# Patient Record
Sex: FEMALE | Race: WHITE | NOT HISPANIC OR LATINO | Employment: FULL TIME | URBAN - METROPOLITAN AREA
[De-identification: names, ages, dates, MRNs, and addresses within clinical notes are randomized per-mention and may not be internally consistent; named-entity substitution may affect disease eponyms.]

---

## 2017-01-31 ENCOUNTER — HISTORICAL (OUTPATIENT)
Dept: ADMINISTRATIVE | Facility: HOSPITAL | Age: 47
End: 2017-01-31

## 2017-02-07 ENCOUNTER — HISTORICAL (OUTPATIENT)
Dept: OTOLARYNGOLOGY | Facility: CLINIC | Age: 47
End: 2017-02-07

## 2017-03-22 ENCOUNTER — HISTORICAL (OUTPATIENT)
Dept: OTOLARYNGOLOGY | Facility: CLINIC | Age: 47
End: 2017-03-22

## 2017-04-21 ENCOUNTER — HISTORICAL (OUTPATIENT)
Dept: ADMINISTRATIVE | Facility: HOSPITAL | Age: 47
End: 2017-04-21

## 2017-04-21 LAB
ABS NEUT (OLG): 6.7 X10(3)/MCL (ref 2.1–9.2)
BASOPHILS # BLD AUTO: 0.03 X10(3)/MCL
BASOPHILS NFR BLD AUTO: 0 % (ref 0–1)
BUN SERPL-MCNC: 12 MG/DL (ref 7–25)
CALCIUM SERPL-MCNC: 9.4 MG/DL (ref 8.4–10.3)
CHLORIDE SERPL-SCNC: 101 MMOL/L (ref 96–110)
CO2 SERPL-SCNC: 28 MMOL/L (ref 24–32)
CREAT SERPL-MCNC: 0.51 MG/DL (ref 0.7–1.1)
EOSINOPHIL # BLD AUTO: 0.11 X10(3)/MCL
EOSINOPHIL NFR BLD AUTO: 1 % (ref 0–5)
ERYTHROCYTE [DISTWIDTH] IN BLOOD BY AUTOMATED COUNT: 14.4 % (ref 11.5–14.5)
GLUCOSE SERPL-MCNC: 120 MG/DL (ref 65–99)
HCT VFR BLD AUTO: 42.6 % (ref 35–46)
HGB BLD-MCNC: 13.8 GM/DL (ref 12–16)
IMM GRANULOCYTES # BLD AUTO: 0.04 10*3/UL
IMM GRANULOCYTES NFR BLD AUTO: 0 %
LYMPHOCYTES # BLD AUTO: 2.24 X10(3)/MCL
LYMPHOCYTES NFR BLD AUTO: 23 % (ref 15–40)
MCH RBC QN AUTO: 28.2 PG (ref 26–34)
MCHC RBC AUTO-ENTMCNC: 32.4 GM/DL (ref 31–37)
MCV RBC AUTO: 86.9 FL (ref 80–100)
MONOCYTES # BLD AUTO: 0.67 X10(3)/MCL
MONOCYTES NFR BLD AUTO: 7 % (ref 4–12)
NEUTROPHILS # BLD AUTO: 6.7 X10(3)/MCL
NEUTROPHILS NFR BLD AUTO: 68 X10(3)/MCL
PLATELET # BLD AUTO: 329 X10(3)/MCL (ref 130–400)
PMV BLD AUTO: 9.3 FL (ref 7.4–10.4)
POTASSIUM SERPL-SCNC: 4.2 MMOL/L (ref 3.6–5.2)
RBC # BLD AUTO: 4.9 X10(6)/MCL (ref 4–5.2)
SODIUM SERPL-SCNC: 137 MMOL/L (ref 135–146)
WBC # SPEC AUTO: 9.8 X10(3)/MCL (ref 4.5–11)

## 2017-04-24 ENCOUNTER — HISTORICAL (OUTPATIENT)
Dept: SURGERY | Facility: HOSPITAL | Age: 47
End: 2017-04-24

## 2017-05-02 ENCOUNTER — HISTORICAL (OUTPATIENT)
Dept: INTERNAL MEDICINE | Facility: CLINIC | Age: 47
End: 2017-05-02

## 2017-05-02 LAB
ABS NEUT (OLG): 7.03 X10(3)/MCL (ref 2.1–9.2)
ALBUMIN SERPL-MCNC: 3.5 GM/DL (ref 3.4–5)
ALBUMIN/GLOB SERPL: 1 RATIO (ref 1–2)
ALP SERPL-CCNC: 54 UNIT/L (ref 20–120)
ALT SERPL-CCNC: 23 UNIT/L
APPEARANCE, UA: CLEAR
AST SERPL-CCNC: 15 UNIT/L
BACTERIA #/AREA URNS AUTO: ABNORMAL /[HPF]
BASOPHILS # BLD AUTO: 0.03 X10(3)/MCL
BASOPHILS NFR BLD AUTO: 0 % (ref 0–1)
BILIRUB SERPL-MCNC: 0.8 MG/DL
BILIRUB UR QL STRIP: NEGATIVE
BILIRUBIN DIRECT+TOT PNL SERPL-MCNC: 0.1 MG/DL
BILIRUBIN DIRECT+TOT PNL SERPL-MCNC: 0.7 MG/DL
BUN SERPL-MCNC: 12 MG/DL (ref 7–25)
CALCIUM SERPL-MCNC: 9 MG/DL (ref 8.4–10.3)
CHLORIDE SERPL-SCNC: 104 MMOL/L (ref 96–110)
CHOLEST SERPL-MCNC: 173 MG/DL
CHOLEST/HDLC SERPL: 3.5 {RATIO} (ref 0–4.4)
CO2 SERPL-SCNC: 25 MMOL/L (ref 24–32)
COLOR UR: YELLOW
CREAT SERPL-MCNC: 0.55 MG/DL (ref 0.7–1.1)
DEPRECATED CALCIDIOL+CALCIFEROL SERPL-MC: 11.46 NG/ML (ref 30–80)
EOSINOPHIL # BLD AUTO: 0.11 X10(3)/MCL
EOSINOPHIL NFR BLD AUTO: 1 % (ref 0–5)
ERYTHROCYTE [DISTWIDTH] IN BLOOD BY AUTOMATED COUNT: 14 % (ref 11.5–14.5)
ERYTHROCYTE [SEDIMENTATION RATE] IN BLOOD: 27 MM/HR (ref 0–20)
GLOBULIN SER-MCNC: 4 GM/ML (ref 2.3–3.5)
GLUCOSE (UA): NORMAL
GLUCOSE SERPL-MCNC: 112 MG/DL (ref 65–99)
HAV IGM SERPL QL IA: NONREACTIVE
HBV CORE IGM SERPL QL IA: NONREACTIVE
HBV SURFACE AG SERPL QL IA: NEGATIVE
HCT VFR BLD AUTO: 41.4 % (ref 35–46)
HCV AB SERPL QL IA: NONREACTIVE
HDLC SERPL-MCNC: 50 MG/DL
HGB BLD-MCNC: 13.4 GM/DL (ref 12–16)
HGB UR QL STRIP: NEGATIVE
HIV 1+2 AB+HIV1 P24 AG SERPL QL IA: NONREACTIVE
HYALINE CASTS #/AREA URNS LPF: ABNORMAL /[LPF]
IMM GRANULOCYTES # BLD AUTO: 0.05 10*3/UL
IMM GRANULOCYTES NFR BLD AUTO: 0 %
KETONES UR QL STRIP: NEGATIVE
LDLC SERPL CALC-MCNC: 99 MG/DL (ref 0–130)
LEUKOCYTE ESTERASE UR QL STRIP: NEGATIVE
LYMPHOCYTES # BLD AUTO: 2.29 X10(3)/MCL
LYMPHOCYTES NFR BLD AUTO: 23 % (ref 15–40)
MCH RBC QN AUTO: 28.3 PG (ref 26–34)
MCHC RBC AUTO-ENTMCNC: 32.4 GM/DL (ref 31–37)
MCV RBC AUTO: 87.3 FL (ref 80–100)
MONOCYTES # BLD AUTO: 0.61 X10(3)/MCL
MONOCYTES NFR BLD AUTO: 6 % (ref 4–12)
NEUTROPHILS # BLD AUTO: 7.03 X10(3)/MCL
NEUTROPHILS NFR BLD AUTO: 70 X10(3)/MCL
NITRITE UR QL STRIP: NEGATIVE
PH UR STRIP: 6 [PH] (ref 4.5–8)
PLATELET # BLD AUTO: 332 X10(3)/MCL (ref 130–400)
PMV BLD AUTO: 9.2 FL (ref 7.4–10.4)
POTASSIUM SERPL-SCNC: 4.2 MMOL/L (ref 3.6–5.2)
PROT SERPL-MCNC: 7.5 GM/DL (ref 6–8)
PROT UR QL STRIP: 10 MG/DL
RBC # BLD AUTO: 4.74 X10(6)/MCL (ref 4–5.2)
RBC #/AREA URNS AUTO: ABNORMAL /[HPF]
RPR SER QL: NORMAL
SODIUM SERPL-SCNC: 139 MMOL/L (ref 135–146)
SP GR UR STRIP: 1.02 (ref 1–1.03)
SQUAMOUS #/AREA URNS LPF: ABNORMAL /[LPF]
T4 SERPL-MCNC: 11.33 MCG/DL (ref 6.09–12.23)
TRIGL SERPL-MCNC: 122 MG/DL
TSH SERPL-ACNC: 0.62 MIU/L (ref 0.5–5)
UROBILINOGEN UR STRIP-ACNC: NORMAL
VIT B12 SERPL-MCNC: 373 PG/ML (ref 180–914)
VLDLC SERPL CALC-MCNC: 24 MG/DL
WBC # SPEC AUTO: 10.1 X10(3)/MCL (ref 4.5–11)
WBC #/AREA URNS AUTO: ABNORMAL /HPF

## 2017-05-04 ENCOUNTER — HISTORICAL (OUTPATIENT)
Dept: RADIOLOGY | Facility: HOSPITAL | Age: 47
End: 2017-05-04

## 2017-05-08 ENCOUNTER — HISTORICAL (OUTPATIENT)
Dept: RADIOLOGY | Facility: HOSPITAL | Age: 47
End: 2017-05-08

## 2017-05-08 LAB
EST. AVERAGE GLUCOSE BLD GHB EST-MCNC: 114 MG/DL
HBA1C MFR BLD: 5.6 % (ref 4.7–5.6)

## 2017-06-05 ENCOUNTER — HISTORICAL (OUTPATIENT)
Dept: INTERNAL MEDICINE | Facility: CLINIC | Age: 47
End: 2017-06-05

## 2017-06-13 LAB — POC BETA-HCG (QUAL): NEGATIVE

## 2017-07-19 ENCOUNTER — HISTORICAL (OUTPATIENT)
Dept: ADMINISTRATIVE | Facility: HOSPITAL | Age: 47
End: 2017-07-19

## 2018-01-15 ENCOUNTER — HISTORICAL (OUTPATIENT)
Dept: INTERNAL MEDICINE | Facility: CLINIC | Age: 48
End: 2018-01-15

## 2018-01-15 LAB
APPEARANCE, UA: ABNORMAL
BACTERIA #/AREA URNS AUTO: ABNORMAL /[HPF]
BILIRUB UR QL STRIP: NEGATIVE
COLOR UR: YELLOW
GLUCOSE (UA): 300 MG/DL
HGB UR QL STRIP: NEGATIVE
HYALINE CASTS #/AREA URNS LPF: ABNORMAL /[LPF]
KETONES UR QL STRIP: ABNORMAL
LEUKOCYTE ESTERASE UR QL STRIP: NEGATIVE
NITRITE UR QL STRIP: ABNORMAL
PH UR STRIP: 6 [PH] (ref 4.5–8)
PROT UR QL STRIP: 20 MG/DL
RBC #/AREA URNS AUTO: ABNORMAL /[HPF]
SP GR UR STRIP: 1.03 (ref 1–1.03)
SQUAMOUS #/AREA URNS LPF: ABNORMAL /[LPF]
UROBILINOGEN UR STRIP-ACNC: NORMAL
WBC #/AREA URNS AUTO: ABNORMAL /HPF

## 2018-05-09 ENCOUNTER — HISTORICAL (OUTPATIENT)
Dept: ADMINISTRATIVE | Facility: HOSPITAL | Age: 48
End: 2018-05-09

## 2018-05-09 LAB
ABS NEUT (OLG): 6.11 X10(3)/MCL (ref 2.1–9.2)
ALBUMIN SERPL-MCNC: 3.6 GM/DL (ref 3.4–5)
ALBUMIN/GLOB SERPL: 1 RATIO (ref 1–2)
ALP SERPL-CCNC: 70 UNIT/L (ref 45–117)
ALT SERPL-CCNC: 34 UNIT/L (ref 12–78)
APPEARANCE, UA: CLEAR
AST SERPL-CCNC: 18 UNIT/L (ref 15–37)
BACTERIA #/AREA URNS AUTO: ABNORMAL /[HPF]
BASOPHILS # BLD AUTO: 0.04 X10(3)/MCL
BASOPHILS NFR BLD AUTO: 0 %
BILIRUB SERPL-MCNC: 0.6 MG/DL (ref 0.2–1)
BILIRUB UR QL STRIP: NEGATIVE
BILIRUBIN DIRECT+TOT PNL SERPL-MCNC: 0.2 MG/DL
BILIRUBIN DIRECT+TOT PNL SERPL-MCNC: 0.4 MG/DL
BUN SERPL-MCNC: 11 MG/DL (ref 7–18)
CALCIUM SERPL-MCNC: 9 MG/DL (ref 8.5–10.1)
CHLORIDE SERPL-SCNC: 106 MMOL/L (ref 98–107)
CHOLEST SERPL-MCNC: 172 MG/DL
CHOLEST/HDLC SERPL: 3.9 {RATIO} (ref 0–4.4)
CO2 SERPL-SCNC: 26 MMOL/L (ref 21–32)
COLOR UR: YELLOW
CREAT SERPL-MCNC: 0.6 MG/DL (ref 0.6–1.3)
CREAT UR-MCNC: 213 MG/DL
EOSINOPHIL # BLD AUTO: 0.11 10*3/UL
EOSINOPHIL NFR BLD AUTO: 1 %
ERYTHROCYTE [DISTWIDTH] IN BLOOD BY AUTOMATED COUNT: 13.7 % (ref 11.5–14.5)
EST. AVERAGE GLUCOSE BLD GHB EST-MCNC: 120 MG/DL
GLOBULIN SER-MCNC: 4.5 GM/ML (ref 2.3–3.5)
GLUCOSE (UA): NORMAL
GLUCOSE SERPL-MCNC: 104 MG/DL (ref 74–106)
HBA1C MFR BLD: 5.8 % (ref 4.2–6.3)
HCT VFR BLD AUTO: 42.1 % (ref 35–46)
HDLC SERPL-MCNC: 44 MG/DL
HGB BLD-MCNC: 13.8 GM/DL (ref 12–16)
HGB UR QL STRIP: NEGATIVE
HYALINE CASTS #/AREA URNS LPF: ABNORMAL /[LPF]
IMM GRANULOCYTES # BLD AUTO: 0.04 10*3/UL
IMM GRANULOCYTES NFR BLD AUTO: 0 %
KETONES UR QL STRIP: NEGATIVE
LDLC SERPL CALC-MCNC: 108 MG/DL (ref 0–130)
LEUKOCYTE ESTERASE UR QL STRIP: NEGATIVE
LYMPHOCYTES # BLD AUTO: 2.82 X10(3)/MCL
LYMPHOCYTES NFR BLD AUTO: 29 % (ref 13–40)
MCH RBC QN AUTO: 29.2 PG (ref 26–34)
MCHC RBC AUTO-ENTMCNC: 32.8 GM/DL (ref 31–37)
MCV RBC AUTO: 89 FL (ref 80–100)
MICROALBUMIN UR-MCNC: 14 MG/L (ref 0–19)
MICROALBUMIN/CREAT RATIO PNL UR: 6.6 MCG/MG CR (ref 0–29)
MONOCYTES # BLD AUTO: 0.68 X10(3)/MCL
MONOCYTES NFR BLD AUTO: 7 % (ref 4–12)
NEUTROPHILS # BLD AUTO: 6.11 X10(3)/MCL
NEUTROPHILS NFR BLD AUTO: 62 X10(3)/MCL
NITRITE UR QL STRIP: NEGATIVE
PH UR STRIP: 6 [PH] (ref 4.5–8)
PLATELET # BLD AUTO: 354 X10(3)/MCL (ref 130–400)
PMV BLD AUTO: 9.8 FL (ref 7.4–10.4)
POTASSIUM SERPL-SCNC: 4.3 MMOL/L (ref 3.5–5.1)
PROT SERPL-MCNC: 8.1 GM/DL (ref 6.4–8.2)
PROT UR QL STRIP: 20 MG/DL
RBC # BLD AUTO: 4.73 X10(6)/MCL (ref 4–5.2)
RBC #/AREA URNS AUTO: ABNORMAL /[HPF]
SODIUM SERPL-SCNC: 137 MMOL/L (ref 136–145)
SP GR UR STRIP: 1.03 (ref 1–1.03)
SQUAMOUS #/AREA URNS LPF: ABNORMAL /[LPF]
TRIGL SERPL-MCNC: 98 MG/DL
TSH SERPL-ACNC: 1.29 MIU/L (ref 0.36–3.74)
UROBILINOGEN UR STRIP-ACNC: NORMAL
VLDLC SERPL CALC-MCNC: 20 MG/DL
WBC # SPEC AUTO: 9.8 X10(3)/MCL (ref 4.5–11)
WBC #/AREA URNS AUTO: ABNORMAL /HPF

## 2018-10-09 ENCOUNTER — HISTORICAL (OUTPATIENT)
Dept: RADIOLOGY | Facility: HOSPITAL | Age: 48
End: 2018-10-09

## 2018-11-07 ENCOUNTER — HISTORICAL (OUTPATIENT)
Dept: ADMINISTRATIVE | Facility: HOSPITAL | Age: 48
End: 2018-11-07

## 2018-11-07 LAB
BUN SERPL-MCNC: 10 MG/DL (ref 7–18)
CALCIUM SERPL-MCNC: 9.2 MG/DL (ref 8.5–10.1)
CHLORIDE SERPL-SCNC: 101 MMOL/L (ref 98–107)
CO2 SERPL-SCNC: 29 MMOL/L (ref 21–32)
CREAT SERPL-MCNC: 0.5 MG/DL (ref 0.6–1.3)
CREAT/UREA NIT SERPL: 20
EST. AVERAGE GLUCOSE BLD GHB EST-MCNC: 120 MG/DL
GLUCOSE SERPL-MCNC: 80 MG/DL (ref 74–106)
HBA1C MFR BLD: 5.8 % (ref 4.2–6.3)
POTASSIUM SERPL-SCNC: 3.8 MMOL/L (ref 3.5–5.1)
SODIUM SERPL-SCNC: 136 MMOL/L (ref 136–145)
T3FREE SERPL-MCNC: 2.85 PG/ML (ref 2.18–3.98)
T3RU NFR SERPL: 30 % (ref 31–39)
T4 FREE SERPL-MCNC: 1.15 NG/DL (ref 0.76–1.46)
T4 SERPL-MCNC: 12.6 MCG/DL (ref 4.8–13.9)
TSH SERPL-ACNC: 1.61 MIU/L (ref 0.36–3.74)

## 2018-11-14 ENCOUNTER — HISTORICAL (OUTPATIENT)
Dept: RADIOLOGY | Facility: HOSPITAL | Age: 48
End: 2018-11-14

## 2019-10-29 ENCOUNTER — HISTORICAL (OUTPATIENT)
Dept: ADMINISTRATIVE | Facility: HOSPITAL | Age: 49
End: 2019-10-29

## 2019-10-29 LAB
APPEARANCE, UA: CLEAR
BACTERIA SPEC CULT: ABNORMAL /HPF
BILIRUB UR QL STRIP: NEGATIVE
COLOR UR: YELLOW
GLUCOSE (UA): ABNORMAL
HGB UR QL STRIP: NEGATIVE
KETONES UR QL STRIP: NEGATIVE
LEUKOCYTE ESTERASE UR QL STRIP: NEGATIVE
NITRITE UR QL STRIP: NEGATIVE
PH UR STRIP: 5.5 [PH] (ref 5–9)
PROT UR QL STRIP: NEGATIVE
RBC #/AREA URNS HPF: ABNORMAL /[HPF]
SP GR UR STRIP: 1.02 (ref 1–1.03)
SQUAMOUS EPITHELIAL, UA: ABNORMAL
UROBILINOGEN UR STRIP-ACNC: 0.2
WBC #/AREA URNS HPF: ABNORMAL /[HPF]

## 2019-11-01 ENCOUNTER — HISTORICAL (OUTPATIENT)
Dept: ADMINISTRATIVE | Facility: HOSPITAL | Age: 49
End: 2019-11-01

## 2019-11-01 LAB
ABS NEUT (OLG): 4.61 X10(3)/MCL (ref 2.1–9.2)
ALBUMIN SERPL-MCNC: 3.5 GM/DL (ref 3.4–5)
ALBUMIN/GLOB SERPL: 0.9 RATIO (ref 1.1–2)
ALP SERPL-CCNC: 78 UNIT/L (ref 38–126)
ALT SERPL-CCNC: 44 UNIT/L (ref 12–78)
AST SERPL-CCNC: 23 UNIT/L (ref 15–37)
BASOPHILS # BLD AUTO: 0 X10(3)/MCL (ref 0–0.2)
BASOPHILS NFR BLD AUTO: 0 %
BILIRUB SERPL-MCNC: 0.5 MG/DL (ref 0.2–1)
BILIRUBIN DIRECT+TOT PNL SERPL-MCNC: 0.1 MG/DL (ref 0–0.5)
BILIRUBIN DIRECT+TOT PNL SERPL-MCNC: 0.4 MG/DL (ref 0–0.8)
BUN SERPL-MCNC: 10 MG/DL (ref 7–18)
CALCIUM SERPL-MCNC: 9.3 MG/DL (ref 8.5–10.1)
CHLORIDE SERPL-SCNC: 105 MMOL/L (ref 98–107)
CHOLEST SERPL-MCNC: 199 MG/DL (ref 0–200)
CHOLEST/HDLC SERPL: 4.1 {RATIO} (ref 0–4)
CO2 SERPL-SCNC: 31 MMOL/L (ref 21–32)
CREAT SERPL-MCNC: 0.65 MG/DL (ref 0.55–1.02)
EOSINOPHIL # BLD AUTO: 0.1 X10(3)/MCL (ref 0–0.9)
EOSINOPHIL NFR BLD AUTO: 1 %
ERYTHROCYTE [DISTWIDTH] IN BLOOD BY AUTOMATED COUNT: 13.6 % (ref 11.5–17)
EST. AVERAGE GLUCOSE BLD GHB EST-MCNC: 137 MG/DL
GLOBULIN SER-MCNC: 3.8 GM/DL (ref 2.4–3.5)
GLUCOSE SERPL-MCNC: 157 MG/DL (ref 74–106)
HBA1C MFR BLD: 6.4 % (ref 4.2–6.3)
HCT VFR BLD AUTO: 43.6 % (ref 37–47)
HDLC SERPL-MCNC: 49 MG/DL (ref 35–60)
HGB BLD-MCNC: 14 GM/DL (ref 12–16)
LDLC SERPL CALC-MCNC: 123 MG/DL (ref 0–129)
LYMPHOCYTES # BLD AUTO: 1.9 X10(3)/MCL (ref 0.6–4.6)
LYMPHOCYTES NFR BLD AUTO: 26 %
MCH RBC QN AUTO: 28.6 PG (ref 27–31)
MCHC RBC AUTO-ENTMCNC: 32.1 GM/DL (ref 33–36)
MCV RBC AUTO: 89 FL (ref 80–94)
MONOCYTES # BLD AUTO: 0.6 X10(3)/MCL (ref 0.1–1.3)
MONOCYTES NFR BLD AUTO: 8 %
NEUTROPHILS # BLD AUTO: 4.61 X10(3)/MCL (ref 2.1–9.2)
NEUTROPHILS NFR BLD AUTO: 64 %
PLATELET # BLD AUTO: 299 X10(3)/MCL (ref 130–400)
PMV BLD AUTO: 9.7 FL (ref 9.4–12.4)
POTASSIUM SERPL-SCNC: 4.1 MMOL/L (ref 3.5–5.1)
PROT SERPL-MCNC: 7.3 GM/DL (ref 6.4–8.2)
RBC # BLD AUTO: 4.9 X10(6)/MCL (ref 4.2–5.4)
SODIUM SERPL-SCNC: 141 MMOL/L (ref 136–145)
TRIGL SERPL-MCNC: 137 MG/DL (ref 30–150)
TSH SERPL-ACNC: 1.19 MIU/L (ref 0.36–3.74)
VLDLC SERPL CALC-MCNC: 27 MG/DL
WBC # SPEC AUTO: 7.2 X10(3)/MCL (ref 4.5–11.5)

## 2020-06-22 ENCOUNTER — HISTORICAL (OUTPATIENT)
Dept: RADIOLOGY | Facility: HOSPITAL | Age: 50
End: 2020-06-22

## 2020-11-30 ENCOUNTER — HISTORICAL (OUTPATIENT)
Dept: ADMINISTRATIVE | Facility: HOSPITAL | Age: 50
End: 2020-11-30

## 2020-11-30 LAB
ABS NEUT (OLG): 5.09 X10(3)/MCL (ref 2.1–9.2)
ALBUMIN SERPL-MCNC: 3.9 GM/DL (ref 3.5–5)
ALBUMIN/GLOB SERPL: 1.1 RATIO (ref 1.1–2)
ALP SERPL-CCNC: 71 UNIT/L (ref 40–150)
ALT SERPL-CCNC: 32 UNIT/L (ref 0–55)
AST SERPL-CCNC: 15 UNIT/L (ref 5–34)
BASOPHILS # BLD AUTO: 0 X10(3)/MCL (ref 0–0.2)
BASOPHILS NFR BLD AUTO: 0 %
BILIRUB SERPL-MCNC: 0.5 MG/DL
BILIRUBIN DIRECT+TOT PNL SERPL-MCNC: 0.2 MG/DL (ref 0–0.5)
BILIRUBIN DIRECT+TOT PNL SERPL-MCNC: 0.3 MG/DL (ref 0–0.8)
BUN SERPL-MCNC: 10 MG/DL (ref 9.8–20.1)
CALCIUM SERPL-MCNC: 9.1 MG/DL (ref 8.4–10.2)
CHLORIDE SERPL-SCNC: 101 MMOL/L (ref 98–107)
CHOLEST SERPL-MCNC: 165 MG/DL
CHOLEST/HDLC SERPL: 4 {RATIO} (ref 0–5)
CO2 SERPL-SCNC: 28 MMOL/L (ref 22–29)
CREAT SERPL-MCNC: 0.69 MG/DL (ref 0.55–1.02)
DEPRECATED CALCIDIOL+CALCIFEROL SERPL-MC: 43 NG/ML (ref 30–80)
EOSINOPHIL # BLD AUTO: 0.1 X10(3)/MCL (ref 0–0.9)
EOSINOPHIL NFR BLD AUTO: 2 %
ERYTHROCYTE [DISTWIDTH] IN BLOOD BY AUTOMATED COUNT: 13.9 % (ref 11.5–17)
EST. AVERAGE GLUCOSE BLD GHB EST-MCNC: 119.8 MG/DL
GLOBULIN SER-MCNC: 3.4 GM/DL (ref 2.4–3.5)
GLUCOSE SERPL-MCNC: 112 MG/DL (ref 74–100)
HBA1C MFR BLD: 5.8 %
HCT VFR BLD AUTO: 41.3 % (ref 37–47)
HDLC SERPL-MCNC: 45 MG/DL (ref 35–60)
HGB BLD-MCNC: 13.5 GM/DL (ref 12–16)
LDLC SERPL CALC-MCNC: 93 MG/DL (ref 50–140)
LYMPHOCYTES # BLD AUTO: 2.2 X10(3)/MCL (ref 0.6–4.6)
LYMPHOCYTES NFR BLD AUTO: 28 %
MCH RBC QN AUTO: 29.2 PG (ref 27–31)
MCHC RBC AUTO-ENTMCNC: 32.7 GM/DL (ref 33–36)
MCV RBC AUTO: 89.2 FL (ref 80–94)
MONOCYTES # BLD AUTO: 0.6 X10(3)/MCL (ref 0.1–1.3)
MONOCYTES NFR BLD AUTO: 7 %
NEUTROPHILS # BLD AUTO: 5.09 X10(3)/MCL (ref 2.1–9.2)
NEUTROPHILS NFR BLD AUTO: 63 %
PLATELET # BLD AUTO: 273 X10(3)/MCL (ref 130–400)
PMV BLD AUTO: 10.5 FL (ref 9.4–12.4)
POTASSIUM SERPL-SCNC: 4.3 MMOL/L (ref 3.5–5.1)
PROT SERPL-MCNC: 7.3 GM/DL (ref 6.4–8.3)
RBC # BLD AUTO: 4.63 X10(6)/MCL (ref 4.2–5.4)
SODIUM SERPL-SCNC: 140 MMOL/L (ref 136–145)
TRIGL SERPL-MCNC: 136 MG/DL (ref 37–140)
TSH SERPL-ACNC: 1.13 UIU/ML (ref 0.35–4.94)
VLDLC SERPL CALC-MCNC: 27 MG/DL
WBC # SPEC AUTO: 8.1 X10(3)/MCL (ref 4.5–11.5)

## 2021-01-08 ENCOUNTER — HISTORICAL (OUTPATIENT)
Dept: RADIOLOGY | Facility: HOSPITAL | Age: 51
End: 2021-01-08

## 2021-01-13 LAB — CRC RECOMMENDATION EXT: NORMAL

## 2022-04-11 ENCOUNTER — HISTORICAL (OUTPATIENT)
Dept: ADMINISTRATIVE | Facility: HOSPITAL | Age: 52
End: 2022-04-11

## 2022-04-24 VITALS
BODY MASS INDEX: 43.93 KG/M2 | SYSTOLIC BLOOD PRESSURE: 122 MMHG | DIASTOLIC BLOOD PRESSURE: 76 MMHG | WEIGHT: 273.38 LBS | OXYGEN SATURATION: 98 % | HEIGHT: 66 IN

## 2022-04-30 NOTE — OP NOTE
DATE OF SURGERY:    2017    SURGEON:  Gisell Santiago M.D.    attending physician:  Edith Yun MD    RESIDENT SURGEON:  Gisell Santiago M.D.    PREOPROCEDURE DIAGNOSIS:  Left tympanic membrane perforation.    POSTPROCEDURE DIAGNOSIS:  Left tympanic membrane perforation.    PROCEDURE:  Left tympanoplasty.    FINDIN% inferior perforation which was clean and dry with mobile and intact ossicles.    BLOOD LOSS:  30 cc.    SPECIMENS:  None.    COMPLICATIONS:  None.    IMPLANTS:  None.    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    PROCEDURE IN DETAIL:  The patient was taken back to the operating room after she was seen in the preoperative holding area.  The procedure as well as risks, benefits, and alternatives were reviewed with the patient.  She was taken back to the operating room and adequate anesthesia was provided.  The patient was intubated and turned 180 degrees from Anesthesia.  No paralysis was used for anesthesia.  The left ear was examined using the operating microscope and the perforation was visualized.  There was a small amount of granulation tissue surrounding the perforation, but otherwise it was clean and dry.  The ear was injected in all 4 quadrants with local anesthetic with 1:2000 epinephrine.       The ear was prepped and draped with Betadine.  A standard time-out was performed, confirming procedure inside.  The ear was irrigated and suctioned.  The perforation was freshened using a Farah needle and cup forceps until all of the edges were bleeding.  Canal incisions were made, first using a sickle knife at 6 and 12 o'clock followed by a [place] blade to deepen the incision.  The transverse incision was made 4 mm approximately from the anulus using a round knife.  Next, we turned our attention to the postauricular area.  Approximately 1 cm behind the postauricular  sulcus, a curvilinear incision was made.  This was deepened until we reached the fascia overlying the temporalis muscle.  A self-retaining retractor was placed, and this fascia was harvested for later placement as a graft.  This was flattened with a fascial press and laid on a Joshua block to dry.  Next, a Bovie was used to make our L-shaped incision at the temporal line extending down toward the mastoid tip.  The incision was carried down through the periosteum.  The periosteum was elevated with periosteal elevator until we reached the spine of Henle.  At this point, a self-retaining retractor was placed and the operating microscope was again brought in.  The periosteum was elevated from the canal using a round knife until we reached our previous incision.  Any incomplete incisions were completed using a round knife and Bellucci scissors.  The vascular strip was then retracted.       The tympanomeatal flap was elevated, again using the round knife until we reached the anulus.  Once the anulus was visualized, a Farah needle was used to slowly elevate the anulus in the inferior quadrant of the middle ear mucosa was incised, and a __________ was used to elevate the anulus inferiorly until we reached 6 o'clock.  The anulus was then elevated superiorly using a Farah needle.  At this point, the chorda tympani was not visualized, but the anulus was carefully elevated, being sure to divide tissues in a manner parallel to the chorda tympani.  The anulus was elevated until we reach the ossicular chain in the attic.  The ossicles were palpated and noted to be mobile.  The tympanic membrane was not elevated off the malleus as the perforation did not extend superior to this area.  After the tympanic membrane was sufficiently elevated, the middle ear was packed with Floxin-soaked Gelfoam.  The fascia graft was placed over the Gelfoam and under the tympanic membrane.  The tympanic membrane was laid back down to ensure that the  graft was placed to cover the perforation.  It was noted that the graft did cover all edges of the perforation and no middle ear or Gelfoam could be seen through the perforation at this point.  The canal was then packed with Gelfoam partially until the tympanic membrane was sufficiently covered.  The retractors were then removed, and the vascular strip was replaced in the canal.  All edges of the vascular strip were everted.  Gelfoam was again placed in the ear canal until we reached the conchal bowl.       The periosteum was closed with 3-0 Vicryl sutures as well as the subcutaneous tissues postauricularly and the dermis.  The skin was closed with Mastisol and Steri-Strips.  The patient was dressed with a [place] dressing.  She was extubated and returned to recovery without any complications.        ______________________________  ANNIKA Wetzel/ALVAREZ  DD:  04/30/2017  Time:  09:03PM  DT:  04/30/2017  Time:  09:59PM  Job #:  [id number]

## 2022-07-12 LAB
HUMAN PAPILLOMAVIRUS (HPV): NORMAL
PAP RECOMMENDATION EXT: NORMAL

## 2022-09-21 ENCOUNTER — HISTORICAL (OUTPATIENT)
Dept: ADMINISTRATIVE | Facility: HOSPITAL | Age: 52
End: 2022-09-21

## 2022-09-29 ENCOUNTER — TELEPHONE (OUTPATIENT)
Dept: INTERNAL MEDICINE | Facility: CLINIC | Age: 52
End: 2022-09-29

## 2022-09-29 DIAGNOSIS — Z00.00 WELLNESS EXAMINATION: Primary | ICD-10-CM

## 2022-09-29 NOTE — TELEPHONE ENCOUNTER
----- Message from Nica Gonzalez sent at 9/29/2022  1:52 PM CDT -----  Regarding: Labs  Pt called and stated if her lab orders can be put in will go do labs Monday Morning..       [phone] Ailyn

## 2022-10-03 ENCOUNTER — LAB VISIT (OUTPATIENT)
Dept: LAB | Facility: HOSPITAL | Age: 52
End: 2022-10-03
Attending: INTERNAL MEDICINE
Payer: COMMERCIAL

## 2022-10-03 DIAGNOSIS — R39.9 UTI SYMPTOMS: ICD-10-CM

## 2022-10-03 DIAGNOSIS — E10.9 TYPE 1 DIABETES MELLITUS WITHOUT COMPLICATION: Primary | ICD-10-CM

## 2022-10-03 DIAGNOSIS — E10.9 TYPE 1 DIABETES MELLITUS WITHOUT COMPLICATION: ICD-10-CM

## 2022-10-03 DIAGNOSIS — Z00.00 WELLNESS EXAMINATION: ICD-10-CM

## 2022-10-03 LAB
ALBUMIN SERPL-MCNC: 4.2 GM/DL (ref 3.5–5)
ALBUMIN/GLOB SERPL: 1.2 RATIO (ref 1.1–2)
ALP SERPL-CCNC: 82 UNIT/L (ref 40–150)
ALT SERPL-CCNC: 57 UNIT/L (ref 0–55)
AST SERPL-CCNC: 28 UNIT/L (ref 5–34)
BASOPHILS # BLD AUTO: 0.04 X10(3)/MCL (ref 0–0.2)
BASOPHILS NFR BLD AUTO: 0.4 %
BILIRUBIN DIRECT+TOT PNL SERPL-MCNC: 0.9 MG/DL
BUN SERPL-MCNC: 14.7 MG/DL (ref 9.8–20.1)
CALCIUM SERPL-MCNC: 10 MG/DL (ref 8.4–10.2)
CHLORIDE SERPL-SCNC: 102 MMOL/L (ref 98–107)
CHOLEST SERPL-MCNC: 215 MG/DL
CHOLEST/HDLC SERPL: 4 {RATIO} (ref 0–5)
CO2 SERPL-SCNC: 31 MMOL/L (ref 22–29)
CREAT SERPL-MCNC: 0.74 MG/DL (ref 0.55–1.02)
DEPRECATED CALCIDIOL+CALCIFEROL SERPL-MC: 41.8 NG/ML (ref 30–80)
EOSINOPHIL # BLD AUTO: 0.03 X10(3)/MCL (ref 0–0.9)
EOSINOPHIL NFR BLD AUTO: 0.3 %
ERYTHROCYTE [DISTWIDTH] IN BLOOD BY AUTOMATED COUNT: 13.5 % (ref 11.5–17)
EST. AVERAGE GLUCOSE BLD GHB EST-MCNC: 185.8 MG/DL
GFR SERPLBLD CREATININE-BSD FMLA CKD-EPI: >60 MLS/MIN/1.73/M2
GLOBULIN SER-MCNC: 3.6 GM/DL (ref 2.4–3.5)
GLUCOSE SERPL-MCNC: 191 MG/DL (ref 74–100)
HBA1C MFR BLD: 8.1 %
HCT VFR BLD AUTO: 45.9 % (ref 37–47)
HDLC SERPL-MCNC: 48 MG/DL (ref 35–60)
HGB BLD-MCNC: 15.4 GM/DL (ref 12–16)
IMM GRANULOCYTES # BLD AUTO: 0.03 X10(3)/MCL (ref 0–0.04)
IMM GRANULOCYTES NFR BLD AUTO: 0.3 %
LDLC SERPL CALC-MCNC: 139 MG/DL (ref 50–140)
LYMPHOCYTES # BLD AUTO: 2.77 X10(3)/MCL (ref 0.6–4.6)
LYMPHOCYTES NFR BLD AUTO: 30 %
MCH RBC QN AUTO: 29.1 PG (ref 27–31)
MCHC RBC AUTO-ENTMCNC: 33.6 MG/DL (ref 33–36)
MCV RBC AUTO: 86.8 FL (ref 80–94)
MONOCYTES # BLD AUTO: 0.76 X10(3)/MCL (ref 0.1–1.3)
MONOCYTES NFR BLD AUTO: 8.2 %
NEUTROPHILS # BLD AUTO: 5.6 X10(3)/MCL (ref 2.1–9.2)
NEUTROPHILS NFR BLD AUTO: 60.8 %
NRBC BLD AUTO-RTO: 0 %
PLATELET # BLD AUTO: 303 X10(3)/MCL (ref 130–400)
PMV BLD AUTO: 9.6 FL (ref 7.4–10.4)
POTASSIUM SERPL-SCNC: 4.6 MMOL/L (ref 3.5–5.1)
PROT SERPL-MCNC: 7.8 GM/DL (ref 6.4–8.3)
RBC # BLD AUTO: 5.29 X10(6)/MCL (ref 4.2–5.4)
SODIUM SERPL-SCNC: 139 MMOL/L (ref 136–145)
TRIGL SERPL-MCNC: 138 MG/DL (ref 37–140)
TSH SERPL-ACNC: 0.82 UIU/ML (ref 0.35–4.94)
VLDLC SERPL CALC-MCNC: 28 MG/DL
WBC # SPEC AUTO: 9.2 X10(3)/MCL (ref 4.5–11.5)

## 2022-10-03 PROCEDURE — 85025 COMPLETE CBC W/AUTO DIFF WBC: CPT

## 2022-10-03 PROCEDURE — 80053 COMPREHEN METABOLIC PANEL: CPT

## 2022-10-03 PROCEDURE — 83036 HEMOGLOBIN GLYCOSYLATED A1C: CPT

## 2022-10-03 PROCEDURE — 82306 VITAMIN D 25 HYDROXY: CPT

## 2022-10-03 PROCEDURE — 36415 COLL VENOUS BLD VENIPUNCTURE: CPT

## 2022-10-03 PROCEDURE — 80061 LIPID PANEL: CPT

## 2022-10-03 PROCEDURE — 84443 ASSAY THYROID STIM HORMONE: CPT

## 2022-10-04 ENCOUNTER — LAB REQUISITION (OUTPATIENT)
Dept: LAB | Facility: HOSPITAL | Age: 52
End: 2022-10-04
Payer: COMMERCIAL

## 2022-10-04 DIAGNOSIS — R39.9 UNSPECIFIED SYMPTOMS AND SIGNS INVOLVING THE GENITOURINARY SYSTEM: ICD-10-CM

## 2022-10-04 DIAGNOSIS — E10.9 TYPE 1 DIABETES MELLITUS WITHOUT COMPLICATIONS: ICD-10-CM

## 2022-10-04 LAB
APPEARANCE UR: CLEAR
BACTERIA #/AREA URNS AUTO: NORMAL /HPF
BILIRUB UR QL STRIP.AUTO: NEGATIVE MG/DL
COLOR UR AUTO: ABNORMAL
GLUCOSE UR QL STRIP.AUTO: ABNORMAL MG/DL
KETONES UR QL STRIP.AUTO: ABNORMAL MG/DL
LEUKOCYTE ESTERASE UR QL STRIP.AUTO: NEGATIVE UNIT/L
NITRITE UR QL STRIP.AUTO: NEGATIVE
PH UR STRIP.AUTO: 6 [PH]
PROT UR QL STRIP.AUTO: ABNORMAL MG/DL
RBC #/AREA URNS AUTO: <5 /HPF
RBC UR QL AUTO: NEGATIVE UNIT/L
SP GR UR STRIP.AUTO: 1.03 (ref 1–1.03)
SQUAMOUS #/AREA URNS AUTO: <5 /HPF
UROBILINOGEN UR STRIP-ACNC: 0.2 MG/DL
WBC #/AREA URNS AUTO: <5 /HPF

## 2022-10-04 PROCEDURE — 81001 URINALYSIS AUTO W/SCOPE: CPT | Performed by: INTERNAL MEDICINE

## 2022-10-04 RX ORDER — CHOLECALCIFEROL (VITAMIN D3) 25 MCG
1000 TABLET ORAL DAILY
COMMUNITY

## 2022-10-04 RX ORDER — ASPIRIN 81 MG/1
81 TABLET ORAL DAILY
COMMUNITY

## 2022-10-04 RX ORDER — FLUTICASONE PROPIONATE 50 MCG
1 SPRAY, SUSPENSION (ML) NASAL DAILY
COMMUNITY

## 2022-10-04 RX ORDER — MULTIVITAMIN
1 TABLET ORAL DAILY
COMMUNITY

## 2022-10-05 ENCOUNTER — OFFICE VISIT (OUTPATIENT)
Dept: INTERNAL MEDICINE | Facility: CLINIC | Age: 52
End: 2022-10-05
Payer: COMMERCIAL

## 2022-10-05 VITALS
BODY MASS INDEX: 45.26 KG/M2 | WEIGHT: 271.63 LBS | HEART RATE: 68 BPM | OXYGEN SATURATION: 98 % | TEMPERATURE: 98 F | HEIGHT: 65 IN | SYSTOLIC BLOOD PRESSURE: 136 MMHG | DIASTOLIC BLOOD PRESSURE: 82 MMHG

## 2022-10-05 DIAGNOSIS — E11.69 DIABETES MELLITUS TYPE 2 IN OBESE: Primary | ICD-10-CM

## 2022-10-05 DIAGNOSIS — Z23 NEED FOR VACCINATION: ICD-10-CM

## 2022-10-05 DIAGNOSIS — E66.9 DIABETES MELLITUS TYPE 2 IN OBESE: Primary | ICD-10-CM

## 2022-10-05 DIAGNOSIS — E11.51 TYPE 2 DIABETES MELLITUS WITH DIABETIC PERIPHERAL ANGIOPATHY WITHOUT GANGRENE, WITHOUT LONG-TERM CURRENT USE OF INSULIN: ICD-10-CM

## 2022-10-05 DIAGNOSIS — Z00.00 WELLNESS EXAMINATION: ICD-10-CM

## 2022-10-05 PROCEDURE — 3079F PR MOST RECENT DIASTOLIC BLOOD PRESSURE 80-89 MM HG: ICD-10-PCS | Mod: CPTII,,, | Performed by: INTERNAL MEDICINE

## 2022-10-05 PROCEDURE — 3079F DIAST BP 80-89 MM HG: CPT | Mod: CPTII,,, | Performed by: INTERNAL MEDICINE

## 2022-10-05 PROCEDURE — 3008F PR BODY MASS INDEX (BMI) DOCUMENTED: ICD-10-PCS | Mod: CPTII,,, | Performed by: INTERNAL MEDICINE

## 2022-10-05 PROCEDURE — 1159F PR MEDICATION LIST DOCUMENTED IN MEDICAL RECORD: ICD-10-PCS | Mod: CPTII,,, | Performed by: INTERNAL MEDICINE

## 2022-10-05 PROCEDURE — 90471 IMMUNIZATION ADMIN: CPT | Mod: ,,, | Performed by: INTERNAL MEDICINE

## 2022-10-05 PROCEDURE — 99214 PR OFFICE/OUTPT VISIT, EST, LEVL IV, 30-39 MIN: ICD-10-PCS | Mod: 25,,, | Performed by: INTERNAL MEDICINE

## 2022-10-05 PROCEDURE — 90471 FLU VACCINE (QUAD) GREATER THAN OR EQUAL TO 3YO PRESERVATIVE FREE IM: ICD-10-PCS | Mod: ,,, | Performed by: INTERNAL MEDICINE

## 2022-10-05 PROCEDURE — 90686 FLU VACCINE (QUAD) GREATER THAN OR EQUAL TO 3YO PRESERVATIVE FREE IM: ICD-10-PCS | Mod: ,,, | Performed by: INTERNAL MEDICINE

## 2022-10-05 PROCEDURE — 99396 PR PREVENTIVE VISIT,EST,40-64: ICD-10-PCS | Mod: 25,,, | Performed by: INTERNAL MEDICINE

## 2022-10-05 PROCEDURE — 99214 OFFICE O/P EST MOD 30 MIN: CPT | Mod: 25,,, | Performed by: INTERNAL MEDICINE

## 2022-10-05 PROCEDURE — 3075F SYST BP GE 130 - 139MM HG: CPT | Mod: CPTII,,, | Performed by: INTERNAL MEDICINE

## 2022-10-05 PROCEDURE — 99396 PREV VISIT EST AGE 40-64: CPT | Mod: 25,,, | Performed by: INTERNAL MEDICINE

## 2022-10-05 PROCEDURE — 3008F BODY MASS INDEX DOCD: CPT | Mod: CPTII,,, | Performed by: INTERNAL MEDICINE

## 2022-10-05 PROCEDURE — 1159F MED LIST DOCD IN RCRD: CPT | Mod: CPTII,,, | Performed by: INTERNAL MEDICINE

## 2022-10-05 PROCEDURE — 1160F PR REVIEW ALL MEDS BY PRESCRIBER/CLIN PHARMACIST DOCUMENTED: ICD-10-PCS | Mod: CPTII,,, | Performed by: INTERNAL MEDICINE

## 2022-10-05 PROCEDURE — 90686 IIV4 VACC NO PRSV 0.5 ML IM: CPT | Mod: ,,, | Performed by: INTERNAL MEDICINE

## 2022-10-05 PROCEDURE — 1160F RVW MEDS BY RX/DR IN RCRD: CPT | Mod: CPTII,,, | Performed by: INTERNAL MEDICINE

## 2022-10-05 PROCEDURE — 3075F PR MOST RECENT SYSTOLIC BLOOD PRESS GE 130-139MM HG: ICD-10-PCS | Mod: CPTII,,, | Performed by: INTERNAL MEDICINE

## 2022-10-05 RX ORDER — LISINOPRIL 5 MG/1
5 TABLET ORAL DAILY
Qty: 90 TABLET | Refills: 3 | Status: SHIPPED | OUTPATIENT
Start: 2022-10-05 | End: 2023-06-20 | Stop reason: SDUPTHER

## 2022-10-05 RX ORDER — FLUCONAZOLE 150 MG/1
150 TABLET ORAL DAILY
Qty: 2 TABLET | Refills: 0 | Status: SHIPPED | OUTPATIENT
Start: 2022-10-05 | End: 2022-10-06

## 2022-10-05 RX ORDER — METFORMIN HYDROCHLORIDE 1000 MG/1
1000 TABLET ORAL 2 TIMES DAILY WITH MEALS
Qty: 180 TABLET | Refills: 3 | Status: SHIPPED | OUTPATIENT
Start: 2022-10-05 | End: 2023-09-11

## 2022-10-05 RX ORDER — PREDNISONE 10 MG/1
20 TABLET ORAL DAILY
COMMUNITY
Start: 2022-10-01 | End: 2023-06-20

## 2022-10-05 NOTE — ASSESSMENT & PLAN NOTE
-patient advised on the importance of avoiding excessive carbohydrates and sugary food intake and having some form of routine aerobic exercise on a regular basis.  - Initiating on Metformin 1000 mg PO BID, advised to start with 500 mg p.o. b.i.d. for 7 days then increasing the dose to 1000 mg p.o. b.i.d.

## 2022-10-05 NOTE — ASSESSMENT & PLAN NOTE
-labs are reviewed all essentially normal except for new diagnosis of diabetes mellitus type 2 and hemoglobin A1c of 8.1  -patient is advised on importance of watching her carbohydrate intake and saturated fat intake, making the right nutritional choices and exercising on a regular basis  -up-to-date with the screening

## 2022-10-05 NOTE — PROGRESS NOTES
Subjective:      Patient ID: Ailyn Ramos is a 51 y.o. female.    Chief Complaint: Annual Exam (Wellness)    Ms Robertson is a 49-year-old female who is here today for her wellness visit. Only medical comorbidities included thyroid nodules. She gets a thyroid ultrasound completed once a year.  She is due for another repeat ultrasound which will be ordered today     A separate E/M visit was done for newly diagnosed diabetes mellitus type 2   Patient has had 2 yeast infections back to back and apparently when she was at her gyn, urinalysis did show glucose in her urine.  She was advised to make an appointment with her primary care.  Patient had not been seen since November 2020.    She will be initiated on metformin for her diabetes.  Also a referral will be sent for Ophthalmology.  She is educated at length on the importance of a diabetic diet and exercise       Pap smear: 07/2022  Mammogram: June 2020, ordered  CRS: 2021  Influenza vaccine: Administered today    Gynecologist: Dr. Montgomery    The patient's Health Maintenance was reviewed and the following appears to be due at this time:   Health Maintenance Due   Topic Date Due    Mammogram  06/22/2021        Past Medical History:  Past Medical History:   Diagnosis Date    Conductive hearing loss     Knee pain     Nontoxic single thyroid nodule     Perforated tympanic membrane      Past Surgical History:   Procedure Laterality Date     diagnostic arthroscopic procedures of knee joint      Repair, primary, disrupted ligament, ankle; both collateral ligaments      TYMPANOPLASTY       Review of patient's allergies indicates:  No Known Allergies    Social History     Socioeconomic History    Marital status: Single   Tobacco Use    Smoking status: Never    Smokeless tobacco: Never   Substance and Sexual Activity    Alcohol use: Yes     Comment: a few a month     Family History   Problem Relation Age of Onset    Stomach cancer Mother        Review of Systems    A  "comprehensive review of systems was performed and is negative except for that stated above  Objective:   /82 (BP Location: Left arm, Patient Position: Sitting, BP Method: Large (Manual))   Pulse 68   Temp 98.1 °F (36.7 °C) (Temporal)   Ht 5' 5" (1.651 m)   Wt 123.2 kg (271 lb 9.6 oz)   SpO2 98%   BMI 45.20 kg/m²     Physical Exam  Constitutional:       Appearance: Normal appearance. She is obese.   HENT:      Head: Normocephalic and atraumatic.      Nose: Nose normal.      Mouth/Throat:      Mouth: Mucous membranes are moist.      Pharynx: Oropharynx is clear.   Eyes:      Extraocular Movements: Extraocular movements intact.      Pupils: Pupils are equal, round, and reactive to light.   Cardiovascular:      Rate and Rhythm: Normal rate and regular rhythm.      Pulses: Normal pulses.   Pulmonary:      Effort: Pulmonary effort is normal.      Breath sounds: Normal breath sounds.   Abdominal:      General: Bowel sounds are normal.      Palpations: Abdomen is soft.   Musculoskeletal:         General: Normal range of motion.      Cervical back: Normal range of motion and neck supple.   Skin:     General: Skin is warm.   Neurological:      General: No focal deficit present.      Mental Status: She is alert and oriented to person, place, and time. Mental status is at baseline.   Psychiatric:         Mood and Affect: Mood normal.     Assessment/ Plan:   1. Diabetes mellitus type 2 in obese  -     Hemoglobin A1C; Future; Expected date: 01/05/2023    2. Need for vaccination  -     Influenza - Quadrivalent (PF)    3. Wellness examination  Assessment & Plan:  -labs are reviewed all essentially normal except for new diagnosis of diabetes mellitus type 2 and hemoglobin A1c of 8.1  -patient is advised on importance of watching her carbohydrate intake and saturated fat intake, making the right nutritional choices and exercising on a regular basis  -up-to-date with the screening      4. Type 2 diabetes mellitus with " diabetic peripheral angiopathy without gangrene, without long-term current use of insulin  Assessment & Plan:  -patient advised on the importance of avoiding excessive carbohydrates and sugary food intake and having some form of routine aerobic exercise on a regular basis.  - Initiating on Metformin 1000 mg PO BID, advised to start with 500 mg p.o. b.i.d. for 7 days then increasing the dose to 1000 mg p.o. b.i.d.      Other orders  -     metFORMIN (GLUCOPHAGE) 1000 MG tablet; Take 1 tablet (1,000 mg total) by mouth 2 (two) times daily with meals.  Dispense: 180 tablet; Refill: 3  -     lisinopriL (PRINIVIL,ZESTRIL) 5 MG tablet; Take 1 tablet (5 mg total) by mouth once daily.  Dispense: 90 tablet; Refill: 3  -     fluconazole (DIFLUCAN) 150 MG Tab; Take 1 tablet (150 mg total) by mouth once daily. for 1 day  Dispense: 2 tablet; Refill: 0

## 2022-10-07 ENCOUNTER — DOCUMENTATION ONLY (OUTPATIENT)
Dept: INTERNAL MEDICINE | Facility: CLINIC | Age: 52
End: 2022-10-07
Payer: COMMERCIAL

## 2022-10-08 ENCOUNTER — DOCUMENTATION ONLY (OUTPATIENT)
Dept: INTERNAL MEDICINE | Facility: CLINIC | Age: 52
End: 2022-10-08
Payer: COMMERCIAL

## 2022-10-10 LAB
LEFT EYE DM RETINOPATHY: NEGATIVE
RIGHT EYE DM RETINOPATHY: NEGATIVE

## 2022-10-15 ENCOUNTER — DOCUMENTATION ONLY (OUTPATIENT)
Dept: INTERNAL MEDICINE | Facility: CLINIC | Age: 52
End: 2022-10-15
Payer: COMMERCIAL

## 2022-11-02 ENCOUNTER — TELEPHONE (OUTPATIENT)
Dept: INTERNAL MEDICINE | Facility: CLINIC | Age: 52
End: 2022-11-02
Payer: COMMERCIAL

## 2022-11-02 RX ORDER — FLUCONAZOLE 150 MG/1
150 TABLET ORAL DAILY
Qty: 2 TABLET | Refills: 0 | Status: SHIPPED | OUTPATIENT
Start: 2022-11-02 | End: 2022-11-03

## 2022-11-02 NOTE — TELEPHONE ENCOUNTER
Medications Ordered This Encounter   Medications    fluconazole (DIFLUCAN) 150 MG Tab     Sig: Take 1 tablet (150 mg total) by mouth once daily. for 1 day     Dispense:  2 tablet     Refill:  0       ----- Message from Nica Gonzalez sent at 11/2/2022  2:14 PM CDT -----  Regarding: Medication (Yeast Infection)  Pt called and stated if you can send her two doses of Diflucan into the pharmacy, states Monistat OTC does not work for her, asked to be sent to [facility] [address] .. please advise     [phone] Ailyn

## 2022-11-07 ENCOUNTER — TELEPHONE (OUTPATIENT)
Dept: INTERNAL MEDICINE | Facility: CLINIC | Age: 52
End: 2022-11-07
Payer: COMMERCIAL

## 2022-11-07 NOTE — TELEPHONE ENCOUNTER
----- Message from Elinor Jewell sent at 11/7/2022  9:55 AM CST -----  Regarding: clarification  Patient states that [facility] needs dr to call with directions on script for a yeast infection    [facility]  [phone]    Ailyn   [phone]

## 2022-11-14 ENCOUNTER — CLINICAL SUPPORT (OUTPATIENT)
Dept: DIABETES | Facility: CLINIC | Age: 52
End: 2022-11-14
Payer: COMMERCIAL

## 2022-11-14 ENCOUNTER — HOSPITAL ENCOUNTER (OUTPATIENT)
Dept: RADIOLOGY | Facility: HOSPITAL | Age: 52
Discharge: HOME | End: 2022-11-14
Attending: OBSTETRICS & GYNECOLOGY
Payer: COMMERCIAL

## 2022-11-14 VITALS — WEIGHT: 268.13 LBS | BODY MASS INDEX: 44.61 KG/M2

## 2022-11-14 DIAGNOSIS — Z12.31 BREAST CANCER SCREENING BY MAMMOGRAM: ICD-10-CM

## 2022-11-14 DIAGNOSIS — E10.9 TYPE 1 DIABETES MELLITUS WITHOUT COMPLICATION: ICD-10-CM

## 2022-11-14 PROCEDURE — 77063 BREAST TOMOSYNTHESIS BI: CPT | Mod: 26,,, | Performed by: STUDENT IN AN ORGANIZED HEALTH CARE EDUCATION/TRAINING PROGRAM

## 2022-11-14 PROCEDURE — 77067 SCR MAMMO BI INCL CAD: CPT | Mod: 26,,, | Performed by: STUDENT IN AN ORGANIZED HEALTH CARE EDUCATION/TRAINING PROGRAM

## 2022-11-14 PROCEDURE — G0108 DIAB MANAGE TRN  PER INDIV: HCPCS | Mod: ,,, | Performed by: INTERNAL MEDICINE

## 2022-11-14 PROCEDURE — 77067 MAMMO DIGITAL SCREENING BILAT WITH TOMO: ICD-10-PCS | Mod: 26,,, | Performed by: STUDENT IN AN ORGANIZED HEALTH CARE EDUCATION/TRAINING PROGRAM

## 2022-11-14 PROCEDURE — 77063 MAMMO DIGITAL SCREENING BILAT WITH TOMO: ICD-10-PCS | Mod: 26,,, | Performed by: STUDENT IN AN ORGANIZED HEALTH CARE EDUCATION/TRAINING PROGRAM

## 2022-11-14 PROCEDURE — G0108 PR DIAB MANAGE TRN  PER INDIV: ICD-10-PCS | Mod: ,,, | Performed by: INTERNAL MEDICINE

## 2022-11-14 PROCEDURE — 77067 SCR MAMMO BI INCL CAD: CPT | Mod: TC

## 2022-11-14 NOTE — PROGRESS NOTES
Diabetes Care Specialist Progress Note  Author: Angela Moore RD  Date: 11/14/2022    Program Intake  Reason for Diabetes Program Visit:: Initial Diabetes Assessment  Current diabetes risk level:: moderate  In the last 12 months, have you:: none  Permission to speak with others about care:: no    Lab Results   Component Value Date    HGBA1C 8.1 (H) 10/03/2022       Clinical         Problem Review  Reviewed Problem List with Patient: yes  Active comorbidities affecting diabetes self-care.: no  Reviewed health maintenance: yes    Clinical Assessment  Current Diabetes Treatment: Oral Medication (1000mg Metformin BID)  Have you ever experienced hypoglycemia (low blood sugar)?: no  Have you ever experienced hyperglycemia (high blood sugar)?: no    Medication Information  How do you obtain your medications?: Patient drives  How many days a week do you miss your medications?: Never  Do you sometimes have difficulty refilling your medications?: No  Medication adherence impacting ability to self-manage diabetes?: No    Labs  Do you have regular lab work to monitor your medications?: Yes  Type of Regular Lab Work: A1c, Cholesterol  Where do you get your labs drawn?: [facility]  Lab Compliance Barriers: No    Nutritional Status  Diet: Regular (see care plan)  Change in appetite?: No  Dentation:: Intact  Recent Changes in Weight: No Recent Weight Change  Current nutritional status an area of need that is impacting patient's ability to self-manage diabetes?: Yes    Additional Social History    Support  Does anyone support you with your diabetes care?: no  Comment: : pt mother passed away 5 months ago  Is Support an area impacting ability to self-manage diabetes?: No    Access to Mass Media & Technology  Does the patient have access to any of the following devices or technologies?: Smart phone  Media or technology needs impacting ability to self-manage diabetes?: No    Cognitive/Behavioral Health  Alert and Oriented:  Yes  Difficulty Thinking: No  Requires Prompting: No  Requires assistance for routine expression?: No  Cognitive or behavioral barriers impacting ability to self-manage diabetes?: No    Culture/Caodaism  Culture or Zoroastrianism beliefs that may impact ability to access healthcare: No    Communication  Language preference: English  Hearing Problems: No  Vision Problems: No  Communication needs impacting ability to self-manage diabetes?: No    Health Literacy  Preferred Learning Method: Face to Face  How often do you need to have someone help you read instructions, pamphlets, or written material from your doctor or pharmacy?: Rarely  Health literacy needs impacting ability to self-manage diabetes?: No      Diabetes Self-Management Skills Assessment    Diabetes Disease Process/Treatment Options  Patient/caregiver able to state what happens when someone has diabetes.: somewhat  Patient/caregiver knows what type of diabetes they have.: yes  Diabetes Type : Type II  Patient able to identify at least three risk factors for diabetes.: yes  Identified risk factors:: age over 40, being overweight, family history  Diabetes Disease Process/Treatment Options: Skills Assessment Completed: Yes  Assessment indicates:: Adequate understanding  Area of need?: No    Nutrition/Healthy Eating  Challenges to healthy eating:: other (see comments)  Method of carbohydrate measurement:: no method  Patient can identify foods that impact blood sugar.: yes  Nutrition/Healthy Eating Skills Assessment Completed:: Yes  Assessment indicates:: Instruction Needed  Area of need?: Yes    Physical Activity/Exercise  Patient's daily activity level:: lightly active  Patient formally exercises outside of work.: no  Patient can identify forms of physical activity.: yes  Patient can identify reasons why exercise/physical activity is important in diabetes management.: yes  Physical Activity/Exercise Skills Assessment Completed: : Yes  Assessment indicates::  Instruction Needed  Area of need?: Yes    Medications  Patient is able to describe current diabetes management routine.: yes  Diabetes management routine:: oral medications (1000mg Metformin BID)  Patient is able to identify current diabetes medications, dosages, and appropriate timing of medications.: yes  Patient understands the purpose of the medications taken for diabetes.: yes  Patient reports problems or concerns with current medication regimen.: no  Medication Skills Assessment Completed:: Yes  Assessment indicates:: Adequate understanding  Area of need?: No    Home Blood Glucose Monitoring  Patient states that blood sugar is checked at home daily.: no  Reasons for not monitoring:: other (see comments)  Home Blood Glucose Monitoring Skills Assessment Completed: : Yes  Assessment indicates:: Instruction Needed  Area of need?: Yes    Acute Complications  Patient is able to identify types of acute complications: Yes  Patient Identified:: Hypoglycemia  Patient is able to state the basic meaning of hypoglycemia?: Yes  Able to state the blood sugar range for hypoglycemia?: yes  Patient can identify general symptoms of hypoglycemia: yes  Able to state proper treatment of hypoglycemia?: yes  Patient identified:: 4 glucose tablets, 5-6 pieces of hard candy  Acute Complications Skills Assessment Completed: : Yes  Assessment indicates:: Adequate understanding  Area of need?: No    Chronic Complications  Patient can identify major chronic complications of diabetes.: yes  Stated chronic complications:: heart disease/heart attack, neuropathy/nerve damage, retinopathy, stroke  Patient can identify ways to prevent or delay diabetes complications.: yes  Patient is aware that having diabetes increases risk of heart disease?: Yes  Patient is aware that heart disease is the leading cause of death and disability in people with diabetes?: Yes  Patient able to state risk factors for heart disease?: Yes  Patient is taking statin?:  No  Has your doctor talked to you about Statin use?: No  Do you want more information on Statins?: Yes  Chronic Complications Skills Assessment Completed: : Yes  Assessment indicates:: Adequate understanding  Area of need?: No    Psychosocial/Coping  Psychosocial/Coping Skills Assessment Completed: : No  Deffered due to:: Time  Area of need?: Yes      Diabetes Self Support Plan         Assessment Summary and Plan    Based on today's diabetes care assessment, the following areas of need were identified:      Social 11/14/2022   Support No   Access to Mass Media/Tech No   Cognitive/Behavioral Health No   Culture/Congregational No   Communication No   Health Literacy No        Clinical 11/14/2022   Medication Adherence No   Lab Compliance No   Nutritional Status Yes        Diabetes Self-Management Skills 11/14/2022   Diabetes Disease Process/Treatment Options No   Nutrition/Healthy Eating Yes - see care plan   Physical Activity/Exercise Yes - at follow up   Medication No   Home Blood Glucose Monitoring Yes - see care plan   Acute Complications No   Chronic Complications No   Psychosocial/Coping Yes - see care plan          Today's interventions were provided through individual discussion, instruction, and written materials were provided.      Patient verbalized understanding of instruction and written materials.  Pt was able to return back demonstration of instructions today. Patient understood key points, needs reinforcement and further instruction.     Diabetes Self-Management Care Plan:    Today's Diabetes Self-Management Care Plan was developed with Ailyn's input. Ailyn has agreed to work toward the following goal(s) to improve his/her overall diabetes control.      Care Plan: Diabetes Management   Updates made since 10/15/2022 12:00 AM        Problem: Healthy Eating         Goal: Eat 3 meals daily with 30-45 g of Carbohydrate per meal.    Start Date: 11/14/2022   Expected End Date: 12/28/2022   This Visit's Progress:  Not met   Priority: High   Barriers: No Barriers Identified   Note:    Pt works as a : 28/14 schedule.  She does have some control over which foods are available when she is at work.   Admits to not having the best eating habits, but has trying to make better choices.  Not eating as many snack type foods (specifically [employer]) and incorporating more fruit in to her diet.  B: oatmeal with fruit or an egg  sandwich, L and D: variety of prepared meals.   May snack on apples with cinnamon, fruit cup or baked chips.    Educated on carb counting with goal consistency at meals. Aim for no more than 30-45g carbs/meal,15 g carbs/snack.  Be sure to incorporate protein when eating.  Gave list of healthy snack ideas and rec using plate method for meal planning.       Task: Reviewed the sources and role of Carbohydrate, Protein, and Fat and how each nutrient impacts blood sugar. Completed 11/14/2022        Task: Provided visual examples using dry measuring cups, food models, and other familiar objects such as computer mouse, deck or cards, tennis ball etc. to help with visualization of portions. Completed 11/14/2022        Task: Explained how to count carbohydrates using the food label and the use of dry measuring cups for accurate carb counting. Completed 11/14/2022        Task: Discussed strategies for choosing healthier menu options when dining out. Completed 11/14/2022     Task: Review the importance of balancing carbohydrates with each meal using portion control techniques to count servings of carbohydrate and label reading to identify serving size and amount of total carbs per serving. Completed 11/14/2022        Task: Provided Sample plate method and reviewed the use of the plate to estimate amounts of carbohydrate per meal. Completed 11/14/2022        Problem: Blood Glucose Self-Monitoring         Goal: Patient agrees to check and record blood sugars 1 times per day.    Start Date: 11/14/2022    Expected End Date: 2022   This Visit's Progress: Not met   Priority: Medium   Barriers: No Barriers Identified   Note:    Pt has not been testing BG but does have testing supplies.  Rec she test once daily, alternating fasting and 2 hours post meal.  Goal ranges and BG log provided       Task: Reviewed the importance of self-monitoring blood glucose and keeping logs. Completed 2022       Task: Provided patient with blood glucose logs, reviewed appropriate timing and frequency to SMBG, education on parameters on when to notify provider and advised patient to bring logs to all appts with PCP/Endocrinologist/Diabetes Care Specialist. Completed 2022       Problem: Psychosocial/Healthy Coping         Goal: Patient agrees to identify and practice at least one healthy coping/self-care behavior each day.    Start Date: 2022   Expected End Date: 2022   This Visit's Progress: On track   Priority: Low   Barriers: Other (comments)   Note:    Pt mother  approx 5 months ago (pt is adopted).  This has been difficult for her.   In this time, her brother has also caused stress with the family (squatting in her late mother's house, etc).  Currently working on handling these situations, but likely contributing to rise in A1c       Task: Discussed strategies for healthy coping with chronic disease. Completed 2022          Follow Up Plan     Follow up in about 6 weeks (around 2022).    Pt A1C up from 5.8% to 8.1% in 2 years.  Has had some emotional stress in the last several months, likely contributing to the rise.    Has also had ear infection, knee pain and yeast infection.  Works offshore .  Very motivated to make lifestyle changes.  Will follow up in approx 6 weeks.  At that time, will complete DDS, discuss exercise and review BG log.    Today's care plan and follow up schedule was discussed with patient.  Ailyn verbalized understanding of the care plan, goals, and agrees to  follow up plan.        The patient was encouraged to communicate with his/her health care provider/physician and care team regarding his/her condition(s) and treatment.  I provided the patient with my contact information today and encouraged to contact me via phone or [facility] Patient Portal as needed.     Length of Visit   Total Time: 60 Minutes

## 2022-12-12 DIAGNOSIS — Z15.01 GENETIC PREDISPOSITION TO BREAST CANCER: Primary | ICD-10-CM

## 2022-12-28 ENCOUNTER — TELEPHONE (OUTPATIENT)
Dept: INTERNAL MEDICINE | Facility: CLINIC | Age: 52
End: 2022-12-28
Payer: COMMERCIAL

## 2022-12-28 ENCOUNTER — NUTRITION (OUTPATIENT)
Dept: DIABETES | Facility: CLINIC | Age: 52
End: 2022-12-28
Payer: COMMERCIAL

## 2022-12-28 VITALS — BODY MASS INDEX: 45.01 KG/M2 | WEIGHT: 270.5 LBS

## 2022-12-28 DIAGNOSIS — E11.51 TYPE 2 DIABETES MELLITUS WITH DIABETIC PERIPHERAL ANGIOPATHY WITHOUT GANGRENE, WITHOUT LONG-TERM CURRENT USE OF INSULIN: Primary | ICD-10-CM

## 2022-12-28 PROCEDURE — G0108 DIAB MANAGE TRN  PER INDIV: HCPCS | Mod: ,,, | Performed by: INTERNAL MEDICINE

## 2022-12-28 PROCEDURE — G0108 PR DIAB MANAGE TRN  PER INDIV: ICD-10-PCS | Mod: ,,, | Performed by: INTERNAL MEDICINE

## 2022-12-28 RX ORDER — INSULIN PUMP SYRINGE, 3 ML
EACH MISCELLANEOUS
Qty: 1 EACH | Refills: 0 | Status: SHIPPED | OUTPATIENT
Start: 2022-12-28 | End: 2024-04-03

## 2022-12-28 RX ORDER — LANCETS
EACH MISCELLANEOUS
Qty: 50 EACH | Refills: 6 | Status: CANCELLED | OUTPATIENT
Start: 2022-12-28

## 2022-12-28 RX ORDER — INSULIN PUMP SYRINGE, 3 ML
EACH MISCELLANEOUS
Qty: 1 EACH | Refills: 0 | Status: CANCELLED | OUTPATIENT
Start: 2022-12-28 | End: 2023-12-28

## 2022-12-28 RX ORDER — LANCETS
EACH MISCELLANEOUS
Qty: 100 EACH | Refills: 5 | Status: SHIPPED | OUTPATIENT
Start: 2022-12-28

## 2022-12-28 NOTE — TELEPHONE ENCOUNTER
----- Message from PEPITO Kennedy sent at 12/28/2022 12:18 PM CST -----    Okay to send diabetic preferred supplies      ----- Message -----  From: Angela Moore RD  Sent: 12/28/2022  10:32 AM CST  To: Chela Sawyer MD    Hi Dr Sawyer,    I am one of the Diabetes Care Specialists with [facility].  I have met with this patient twice and she would like an rx to be sent in for testing supplies.    I have completed the order; would you mind signing it to be sent to her pharmacy?    Thanks & Happy New Year!    Angela

## 2022-12-28 NOTE — PROGRESS NOTES
Diabetes Care Specialist Progress Note  Author: Angela Moore RD  Date: 12/28/2022    Program Intake  Reason for Diabetes Program Visit:: Intervention  Type of Intervention:: Individual  Individual: Education  Current diabetes risk level:: moderate  In the last 12 months, have you:: none  Permission to speak with others about care:: no    Lab Results   Component Value Date    HGBA1C 8.1 (H) 10/03/2022       Clinical      Clinical Assessment  Current Diabetes Treatment: Oral Medication (1000mg Metformin BID)  Have you ever experienced hypoglycemia (low blood sugar)?: no  Have you ever experienced hyperglycemia (high blood sugar)?: no    Medication Information  How do you obtain your medications?: Patient drives  How many days a week do you miss your medications?: Never  Do you sometimes have difficulty refilling your medications?: No  Medication adherence impacting ability to self-manage diabetes?: No    Labs  Do you have regular lab work to monitor your medications?: Yes  Type of Regular Lab Work: A1c, Cholesterol  Where do you get your labs drawn?: [facility]  Lab Compliance Barriers: No    Nutritional Status  Diet: Regular (see care plan)  Change in appetite?: No  Dentation:: Intact  Current nutritional status an area of need that is impacting patient's ability to self-manage diabetes?: No    Diabetes Self-Management Skills Assessment    Diabetes Disease Process/Treatment Options  Area of need?: No    Nutrition/Healthy Eating  Method of carbohydrate measurement:: carb counting/reading labels  Patient can identify foods that impact blood sugar.: yes  Nutrition/Healthy Eating Skills Assessment Completed:: Yes  Assessment indicates:: Adequate understanding  Area of need?: No    Physical Activity/Exercise  Patient's daily activity level:: lightly active  Patient formally exercises outside of work.: no  Patient can identify forms of physical activity.: yes  Patient can identify reasons why exercise/physical activity is  important in diabetes management.: yes  Physical Activity/Exercise Skills Assessment Completed: : Yes  Assessment indicates:: Instruction Needed  Area of need?: Yes    Medications  Patient is able to describe current diabetes management routine.: yes  Diabetes management routine:: oral medications  Patient is able to identify current diabetes medications, dosages, and appropriate timing of medications.: yes  Patient understands the purpose of the medications taken for diabetes.: yes  Patient reports problems or concerns with current medication regimen.: no  Medication Skills Assessment Completed:: Yes  Assessment indicates:: Adequate understanding  Area of need?: No    Home Blood Glucose Monitoring  Patient states that blood sugar is checked at home daily.: yes  Monitoring Method:: home glucometer  Home glucometer meter type:: AccuCheck  How often do you check your blood sugar?: Once a day  When do you check your blood sugar?: Before breakfast, 2 hours after meal, Before bedtime  When you check what is your typical blood sugar range? : see log  Blood glucose logs:: yes  Blood glucose logs reviewed today?: yes  Home Blood Glucose Monitoring Skills Assessment Completed: : Yes  Assessment indicates:: Adequate understanding  Area of need?: No    Acute Complications  Area of need?: No    Chronic Complications  Area of need?: No    Psychosocial/Coping  Patient can identify ways of coping with chronic disease.: yes  Psychosocial/Coping Skills Assessment Completed: : Yes  Assessment indicates:: Adequate understanding  Area of need?: No        Assessment Summary and Plan    Based on today's diabetes care assessment, the following areas of need were identified:      Social 11/14/2022   Support No   Access to Mass Media/Tech No   Cognitive/Behavioral Health No   Culture/Taoism No   Communication No   Health Literacy No        Clinical 12/28/2022   Medication Adherence No   Lab Compliance No   Nutritional Status No         Diabetes Self-Management Skills 12/28/2022   Diabetes Disease Process/Treatment Options No   Nutrition/Healthy Eating No   Physical Activity/Exercise Yes   Medication No   Home Blood Glucose Monitoring No   Acute Complications No   Chronic Complications No   Psychosocial/Coping No          Today's interventions were provided through individual discussion, instruction, and written materials were provided.      Patient verbalized understanding of instruction and written materials.  Pt was able to return back demonstration of instructions today. Patient understood key points, needs reinforcement and further instruction.     Diabetes Self-Management Care Plan:    Today's Diabetes Self-Management Care Plan was developed with Ailyn's input. Ailyn has agreed to work toward the following goal(s) to improve his/her overall diabetes control.      Care Plan: Diabetes Management   Updates made since 11/28/2022 12:00 AM        Problem: Healthy Eating         Goal: Eat 3 meals daily with 30-45 g of Carbohydrate per meal.    Start Date: 11/14/2022   Expected End Date: 2/6/2023   Recent Progress: Not met   Priority: High   Barriers: No Barriers Identified   Note:    12/28/22: Pt has been doing a good job of carb counting and trying to stay within rec carb limits.  Does admit to falling off the wagon a bit during the holidays, but is motivated to get back on track      Pt works as a : 28/14 schedule.  She does have some control over which foods are available when she is at work.   Admits to not having the best eating habits, but has trying to make better choices.  Not eating as many snack type foods (specifically [employer]) and incorporating more fruit in to her diet.  B: oatmeal with fruit or an egg  sandwich, L and D: variety of prepared meals.   May snack on apples with cinnamon, fruit cup or baked chips.    Educated on carb counting with goal consistency at meals. Aim for no more than 30-45g carbs/meal,15  g carbs/snack.  Be sure to incorporate protein when eating.  Gave list of healthy snack ideas and rec using plate method for meal planning.       Problem: Blood Glucose Self-Monitoring         Goal: Patient agrees to check and record blood sugars 1 times per day. Completed 2022   Start Date: 2022   Expected End Date: 2022   Recent Progress: Not met   Priority: Medium   Barriers: No Barriers Identified   Note:    22:  Pt has been testing BG at least once daily.  Fasting most days.  Also occ testing 2 hours post meal and bed time.  See log.  Does request rx for glucometer  Completed order and sent to HCP to sign and send to pharmacy.        Pt has not been testing BG but does have testing supplies.  Rec she test once daily, alternating fasting and 2 hours post meal.  Goal ranges and BG log provided       Task: Provided patient with a meter today and sent Rx request to provider to send to patients pharmacy. Completed 2022        Task: Instructed on how to self-monitor blood glucose using a home glucometer, how to properly dispose of used strips and lancets after use, and how to appropriately store meter and supplies. Completed 2022        Task: Discussed ways to minimize pain when monitoring blood glucose. Completed 2022                  Problem: Psychosocial/Healthy Coping         Goal: Patient agrees to identify and practice at least one healthy coping/self-care behavior each day. Completed 2022   Start Date: 2022   Expected End Date: 2022   Recent Progress: On track   Priority: Low   Barriers: Other (comments)   Note:    22:  Pt has resolved issues with her brother and sold her mothers house.  This has alleviated a significant amount of stress for her.         Pt mother  approx 5 months ago (pt is adopted).  This has been difficult for her.   In this time, her brother has also caused stress with the family (squatting in her late mother's house,  etc).  Currently working on handling these situations, but likely contributing to rise in A1c       Problem: Physical Activity and Exercise         Goal: Patient agrees to increase physical activity to a goal of 7 times per week for 10 minutes.    Start Date: 12/28/2022   Expected End Date: 2/6/2023   This Visit's Progress: Not met   Priority: High   Barriers: Other (comments)   Note:    12/28/22:  Pt has not been exercising.  Reports her knee is feeling a lot better and feels she can start.  Her morning CBG's are a little above goal; exercise and weight loss will help.  Goal is to walk for at least 10 minutes every day       Task: Discussed role of physical activity on reducing insulin resistance and improvement in overall glycemic control. Completed 12/28/2022        Task: Discussed role of physical activity as it relates to weight loss Completed 12/28/2022         Follow Up Plan     Follow up in about 6 weeks (around 2/8/2023).    Pt has been carb counting and doing a great job of testing BG.  Will need to work on incorporating daily exercise to promote weight loss.    Also sent rx for DM testing supplies to HCP to sign and send to pharmacy.     Today's care plan and follow up schedule was discussed with patient.  Ailyn verbalized understanding of the care plan, goals, and agrees to follow up plan.        The patient was encouraged to communicate with his/her health care provider/physician and care team regarding his/her condition(s) and treatment.  I provided the patient with my contact information today and encouraged to contact me via phone or [facility] Patient Portal as needed.     Length of Visit   Total Time: 30 Minutes

## 2023-01-09 PROBLEM — Z00.00 WELLNESS EXAMINATION: Status: RESOLVED | Noted: 2022-10-05 | Resolved: 2023-01-09

## 2023-01-26 DIAGNOSIS — E11.51 TYPE 2 DIABETES MELLITUS WITH DIABETIC PERIPHERAL ANGIOPATHY WITHOUT GANGRENE, WITHOUT LONG-TERM CURRENT USE OF INSULIN: Primary | ICD-10-CM

## 2023-01-30 ENCOUNTER — TELEPHONE (OUTPATIENT)
Dept: ADMINISTRATIVE | Facility: HOSPITAL | Age: 53
End: 2023-01-30
Payer: COMMERCIAL

## 2023-01-30 NOTE — TELEPHONE ENCOUNTER
----- Message from Kitty Fraga MA sent at 1/26/2023  7:10 AM CST -----  Regarding: Dr BRITTNEY LAL Monday 2-6-23       1. Are there any outstanding Labs, imaging or referrals in the patient's chart?      4 month follow up for diabetes    Non fasting labs ordered and ready to do prior to appt.    Last wellness 10-5-22           2. . Has the patient been seen in an ER, urgent care clinic, or any other health care    provider since their last visit? If yes when and where?

## 2023-02-03 ENCOUNTER — OFFICE VISIT (OUTPATIENT)
Dept: SURGERY | Facility: CLINIC | Age: 53
End: 2023-02-03
Payer: COMMERCIAL

## 2023-02-03 VITALS
BODY MASS INDEX: 45.59 KG/M2 | SYSTOLIC BLOOD PRESSURE: 157 MMHG | RESPIRATION RATE: 18 BRPM | HEART RATE: 74 BPM | HEIGHT: 65 IN | WEIGHT: 273.63 LBS | OXYGEN SATURATION: 98 % | TEMPERATURE: 98 F | DIASTOLIC BLOOD PRESSURE: 79 MMHG

## 2023-02-03 DIAGNOSIS — Z12.31 SCREENING MAMMOGRAM FOR BREAST CANCER: ICD-10-CM

## 2023-02-03 DIAGNOSIS — Z15.01 GENETIC PREDISPOSITION TO BREAST CANCER: ICD-10-CM

## 2023-02-03 DIAGNOSIS — Z80.3 FAMILY HISTORY OF BREAST CANCER: ICD-10-CM

## 2023-02-03 DIAGNOSIS — Z91.89 AT HIGH RISK FOR BREAST CANCER: Primary | ICD-10-CM

## 2023-02-03 PROCEDURE — 1159F MED LIST DOCD IN RCRD: CPT | Mod: CPTII,S$GLB,,

## 2023-02-03 PROCEDURE — 3008F BODY MASS INDEX DOCD: CPT | Mod: CPTII,S$GLB,,

## 2023-02-03 PROCEDURE — 3078F DIAST BP <80 MM HG: CPT | Mod: CPTII,S$GLB,,

## 2023-02-03 PROCEDURE — 99999 PR PBB SHADOW E&M-EST. PATIENT-LVL V: CPT | Mod: PBBFAC,,,

## 2023-02-03 PROCEDURE — 4010F PR ACE/ARB THEARPY RXD/TAKEN: ICD-10-PCS | Mod: CPTII,S$GLB,,

## 2023-02-03 PROCEDURE — 4010F ACE/ARB THERAPY RXD/TAKEN: CPT | Mod: CPTII,S$GLB,,

## 2023-02-03 PROCEDURE — 3078F PR MOST RECENT DIASTOLIC BLOOD PRESSURE < 80 MM HG: ICD-10-PCS | Mod: CPTII,S$GLB,,

## 2023-02-03 PROCEDURE — 1159F PR MEDICATION LIST DOCUMENTED IN MEDICAL RECORD: ICD-10-PCS | Mod: CPTII,S$GLB,,

## 2023-02-03 PROCEDURE — 3077F SYST BP >= 140 MM HG: CPT | Mod: CPTII,S$GLB,,

## 2023-02-03 PROCEDURE — 99205 PR OFFICE/OUTPT VISIT, NEW, LEVL V, 60-74 MIN: ICD-10-PCS | Mod: S$GLB,,,

## 2023-02-03 PROCEDURE — 3008F PR BODY MASS INDEX (BMI) DOCUMENTED: ICD-10-PCS | Mod: CPTII,S$GLB,,

## 2023-02-03 PROCEDURE — 99205 OFFICE O/P NEW HI 60 MIN: CPT | Mod: S$GLB,,,

## 2023-02-03 PROCEDURE — 99999 PR PBB SHADOW E&M-EST. PATIENT-LVL V: ICD-10-PCS | Mod: PBBFAC,,,

## 2023-02-03 PROCEDURE — 3077F PR MOST RECENT SYSTOLIC BLOOD PRESSURE >= 140 MM HG: ICD-10-PCS | Mod: CPTII,S$GLB,,

## 2023-02-03 NOTE — PROGRESS NOTES
[facility] Breast Surg  Breast Surgical Oncology  New Patient Office Visit - H&P      Referring Provider:  Dr. Jesenia Montgomery     PCP: Dr. Sawyer     Chief Complaint:   Chief Complaint   Patient presents with    Genetic Evaluation     New patient referred for high risk, recent mammogram done on 22, no genetic testing done, maternal mother and sister with breast cancer      Subjective:      History of Present Illness:  Ailyn Ramos  is a pleasant female patient who initially presents on  2023 at 52 y.o. years old for evaluation and assessment of risk for breast cancer based on  the Tyrer - Cuzick Breast Cancer Risk Model (> 20% indicates elevated lifetime risk). Her lifetime risk is calculated to be 33.3%,.  Her 5 year Griselda score is 3.8%.     She currently denies any breast issues including rashes, redness, pain, swelling, nipple discharge, or new lumps/masses.    Imagin/15/2022 SCR MG at [facility]- Negative. No significant masses, calcifications, or other findings are seen in either breast.  There has been no significant interval change.    Pathology:       OB / GYN History   Menarche Onset:12  Menopause: Menopause: postmenopausal at 51  Hormonal birth control (duration): 4years  Pregnancies: 0  Age at first child birth: n/a   Child births: 0  Hysterectomy/Oophorectomy: n/a   HRT: no    Family History of Cancer (& age at diagnosis):  -   Family History   Problem Relation Age of Onset    Stomach cancer Mother         diganosed late 50s early 60s    Breast cancer Mother 73    Breast cancer Sister 54        Lifestyle:  Height and Weight: 5'5, 273  BMI: Body mass index is 45.53 kg/m².     Other:  # of breast biopsies (when and pathology results): 0  MG breast density BIRADS B  Prior thoracic RT: none  Genetic testing: no  Ashkenazi Holiness descent: No    Other History:     Past Medical History:   Diagnosis Date    Conductive hearing loss     Hypertension     Knee  pain     Nontoxic single thyroid nodule     Perforated tympanic membrane     Type 2 diabetes mellitus with diabetic peripheral angiopathy without gangrene, without long-term current use of insulin         Past Surgical History:   Procedure Laterality Date     diagnostic arthroscopic procedures of knee joint      Repair, primary, disrupted ligament, ankle; both collateral ligaments      TYMPANOPLASTY          Social History     Socioeconomic History    Marital status: Single   Tobacco Use    Smoking status: Never    Smokeless tobacco: Never   Substance and Sexual Activity    Alcohol use: Yes     Comment: a few a month    Drug use: Never        Immunization History   Administered Date(s) Administered    Influenza - Quadrivalent - PF *Preferred* (6 months and older) 10/05/2022    Influenza - Trivalent - PF (ADULT) 11/30/2020, 11/30/2020        Medications/Allergies:       Current Outpatient Medications:     ascorbic acid, vitamin C, (VITAMIN C) 100 MG tablet, Take 100 mg by mouth once daily., Disp: , Rfl:     aspirin (ECOTRIN) 81 MG EC tablet, Take 81 mg by mouth Daily., Disp: , Rfl:     blood sugar diagnostic Strp, To check BG 1 times daily, to use with insurance preferred meter, Disp: 100 strip, Rfl: 5    blood-glucose meter kit, To check BG 1 times daily, to use with insurance preferred meter, Disp: 1 each, Rfl: 0    fluticasone propionate (FLONASE) 50 mcg/actuation nasal spray, 1 spray by Each Nostril route once daily. PRN, Disp: , Rfl:     lancets Misc, To check BG 1 times daily, to use with insurance preferred meter, Disp: 100 each, Rfl: 5    lisinopriL (PRINIVIL,ZESTRIL) 5 MG tablet, Take 1 tablet (5 mg total) by mouth once daily., Disp: 90 tablet, Rfl: 3    mecobalamin (B12 ACTIVE ORAL), Take 1 tablet by mouth Daily., Disp: , Rfl:     metFORMIN (GLUCOPHAGE) 1000 MG tablet, Take 1 tablet (1,000 mg total) by mouth 2 (two) times daily with meals., Disp: 180 tablet, Rfl: 3    multivitamin (THERAGRAN) per tablet,  Take 1 tablet by mouth once daily., Disp: , Rfl:     tumeric-ging-olive-oreg-capryl 100 mg-150 mg- 50 mg-150 mg Cap, Take by mouth., Disp: , Rfl:     vitamin D (VITAMIN D3) 1000 units Tab, Take 1,000 Units by mouth Daily., Disp: , Rfl:     predniSONE (DELTASONE) 10 MG tablet, Take 3 tablets by mouth Daily., Disp: , Rfl:     Review of patient's allergies indicates:  No Known Allergies     Review of Systems:      Constitutional: denies fevers, chills, weight loss  HEENT: denies blurry/double vision, changes in hearing, odynophagia, dysphagia  Respiratory: denies cough, shortness of breath  Cardiovascular: denies palpitations, swelling of the extremities  GI: denies abdominal pain, nausea/vomiting, hematochezia, frequent stools  : denies frequency, dysuria, flank pain, hematuria  Skin: denies new rashes  Neurological: denies muscular/sensory deficiencies, loss of coordination, headaches, memory changes  Endo: denies hair loss/thinning, nervousness, hot flashes, heat/cold intolerance, lumps in the neck area  Heme: denies easy bruising and fatigue  Psychological: denies anxious/depressive moods  Musculoskeletal: denies bony pain, muscle cramps, swollen joints       Objective/Physical Exam     Vitals:    02/03/23 0956   BP: (!) 157/79   Pulse: 74   Resp: 18   Temp: 98 °F (36.7 °C)        General: The patient is awake, alert and oriented times three. The patient is well nourished and in no acute distress.  Neck: There is no evidence of palpable cervical, supraclavicular or axillary adenopathy. The neck is supple. The thyroid is not enlarged.  Musculoskeletal: The patient has a normal range of motion of her bilateral upper extremities.  Chest: Examination of the chest wall fails to reveal any obvious abnormalities. Nonlabored breathing, symmetric expansion.  Breast:  Right: Examination of right breast fails to reveal any dominant masses or areas of significant focal nodularity. The nipple is everted without evidence of  discharge. There is no skin dimpling with movement of the pectoralis. There are no significant skin changes overlying the breast.   Left: Examination of the left breast fails to reveal any dominant masses or areas of significant focal nodularity. The nipple is everted without evidence of discharge. There is no skin dimpling with movement of the pectoralis. There are no significant skin changes overlying the breast.  Abdomen: The abdomen is soft, flat, nontender and nondistended.  Integumentary: no rashes or skin lesions present  Neurologic: cranial nerves intact, no signs of peripheral neurological deficit, motor/sensory function intact     Assessment and Plan     Patient Active Problem List   Diagnosis    Type 2 diabetes mellitus with diabetic peripheral angiopathy without gangrene, without long-term current use of insulin       Ailyn was seen today for genetic evaluation.    Diagnoses and all orders for this visit:    At high risk for breast cancer  -     MRI Breast w/wo Contrast, w/CAD, Bilateral; Future    Genetic predisposition to breast cancer  -     Ambulatory referral/consult to Breast Surgery    Screening mammogram for breast cancer  -     Mammo Digital Screening Bilat w/ Amos; Future    Family history of breast cancer  -     MRI Breast w/wo Contrast, w/CAD, Bilateral; Future     --------------------------------------------------------------------------------------------------------------  After the initial clinical evaluation nearly 30 minutes were on counseling the patients regarding the options for management. Risk reduction strategies were discussed.     1. Lifestyle factors: As with other types of cancer, studies continue to show that various lifestyle factors may contribute to the development of breast cancer.     Weight: Recent studies have shown that postmenopausal women who are overweight or obese have an increased risk of breast cancer. These women also have a higher risk of having the cancer  "come back after treatment.     Physical activity: Decreased physical activity is associated with an increased risk of developing breast cancer and a higher risk of having the cancer come back after treatment. Regular physical activity may protect against breast cancer by helping women maintain a healthy body weight, lowering hormone levels, or causing changes in a womens metabolism or immune factors.     Alcohol: Current research suggests that having more than 1 to 2 alcoholic drinks, including beer, wine, and spirits, per day raises the risk of breast cancer, as well as the risk of having the cancer come back after treatment.     Food: There is no reliable research that confirms that eating or avoiding specific foods reduces the risk of developing breast cancer or having the cancer come back after treatment. However, eating more fruits and vegetables and fewer animal fats is linked with many health benefits.     2. Prevention:  Surgery to lower cancer risk: For women with BRCA1 or BRCA2 genetic mutations, which substantially increase the risk of breast cancer, preventive removal of the breasts may be considered. The procedure, called a prophylactic mastectomy, appears to reduce the risk of developing breast cancer by at least 95%. Women with these mutations should also consider the preventive removal of the ovaries and fallopian tubes, called a prophylactic salpingo-oophorectomy. This procedure can reduce the risk of developing ovarian cancer, as well as breast cancer, by stopping the ovaries from making estrogen.      Drugs to lower cancer risk (Chemoprevention): Women who have a higher than usual risk of developing breast cancer may consider certain drugs that may help prevent breast cancer. This approach may also be called "chemoprevention." For breast cancer, this is the use of hormone-blocking drugs to reduce cancer risk. The drugs, tamoxifen (Soltamox) and raloxifene (Evista), are approved by the U.[place]. Food " and Drug Administration (FDA) to lower breast cancer risk. These drugs are called selective estrogen receptor modulators (SERMs) and are not chemotherapy. A SERM is a medication that blocks estrogen receptors in some tissues and not others. Both women who have gone through menopause and those who have not may take tamoxifen. Raloxifene is only approved for women who have gone through menopause. Each drug also has different side effects.     Aromatase inhibitors (AIs) have also been shown to lower breast cancer risk. AIs are a type of hormone-blocking treatment that reduces the amount of estrogen in a woman's body by stopping tissues and organs other than the ovaries from producing estrogen. They can only be used by women who have gone through menopause. However, no AIs have been approved by the FDA for lowering breast cancer risk in women who do not have the disease.     3. Surveillance: Women at greater than 20 percent average lifetime risk of invasive breast cancer based mainly on family history     Clinical Breast exam: Every 6-12 months starting at age found to be at increased risk by risk model     Mammogram: Every year starting 10 years younger than the youngest breast cancer case in the family (but not before age 30)     Breast MRI: Every year starting 10 years younger than the youngest breast cancer case in the family (but not before age 25). If you have a first-degree relative with a BRCA1/2 gene mutation, youre encouraged to get genetic counseling and/or testing before getting MRI as part of screening (for those who do not wish to have genetic testing, MRI is recommended). Breast MRI in combination with mammography is better than mammography alone at finding breast cancer in certain women at higher than average risk.    --------------------------------------------------------------------------------------------------------------      PLAN:    1. Lifestyle - Healthy lifestyle guidelines were reviewed. She  was encouraged to engage in regular exercise, maintain a healthy body weight, and avoid excessive alcohol consumption. Healthy nutritional guidelines were also discussed. Self-breast examination was reviewed with the patient in detail and she was encouraged to perform this on a monthly basis.    2. Surveillance - She desires undergoing high risk screening with annual screening mammograms and breast MRIs. In the absence of significant clinical findings in the interval, I recommend a high risk screening MRI in May. RTC in June. SCR MG in November.     3. Prevention - We had a brief discussion/education about indications for preventative mastectomy or chemoprevention.  These methods are recommended, however, patient would like to think about it and let me know at her follow up in June. An Educational handout was provided to her in clinic today.     4. Genetics - According to NCCN guidelines, she does not currently meet criteria for genetic testing. Will continue to monitor.    5. Continue to see OB/GYN for CBE. Recommend two CBE a year. One with us and one with your OB/GYN Provider. We recommend them to be at a six month interval.      6. Please call our office with any questions or concerns that may arise before the next appointment.     All of her questions were answered.     SERGIO Mcneal      --------------------------------------------------------------------------------------------------------------  --------------------------------------------------------------------------------------------------------------  Total time on the date of the visit ranged from 60-74 mins (06762). Total time includes both face-to-face and non-face-to-face time personally spent by myself on the day of the visit.    Non-face-to-face time included:  _X_ preparing to see the patient such as reviewing the patient record  _X_ obtaining and reviewing separately obtained history  _X_ independently interpreting results  _X_  documenting clinical information in electronic health record.    Face-to-face time included:  _X_ performing an appropriate history and examination  _X_ communicating results to the patient  _X_ counseling and educating the patient  __ ordering appropriate medications  _x_ ordering appropriate tests  _X_ ordering appropriate procedures (including follow-up)  _X_ answering any questions the patient had    Total Time spent on date of visit: 60 minutes

## 2023-02-06 ENCOUNTER — OFFICE VISIT (OUTPATIENT)
Dept: INTERNAL MEDICINE | Facility: CLINIC | Age: 53
End: 2023-02-06
Payer: COMMERCIAL

## 2023-02-06 DIAGNOSIS — I10 PRIMARY HYPERTENSION: ICD-10-CM

## 2023-02-06 DIAGNOSIS — Z13.6 ENCOUNTER FOR SCREENING FOR CARDIOVASCULAR DISORDERS: Primary | ICD-10-CM

## 2023-02-06 DIAGNOSIS — E04.1 NONTOXIC SINGLE THYROID NODULE: ICD-10-CM

## 2023-02-06 DIAGNOSIS — E11.51 TYPE 2 DIABETES MELLITUS WITH DIABETIC PERIPHERAL ANGIOPATHY WITHOUT GANGRENE, WITHOUT LONG-TERM CURRENT USE OF INSULIN: ICD-10-CM

## 2023-02-06 DIAGNOSIS — E78.2 MIXED HYPERLIPIDEMIA: ICD-10-CM

## 2023-02-06 PROCEDURE — 3061F NEG MICROALBUMINURIA REV: CPT | Mod: CPTII,,, | Performed by: INTERNAL MEDICINE

## 2023-02-06 PROCEDURE — 99215 PR OFFICE/OUTPT VISIT, EST, LEVL V, 40-54 MIN: ICD-10-PCS | Mod: ,,, | Performed by: INTERNAL MEDICINE

## 2023-02-06 PROCEDURE — 4010F PR ACE/ARB THEARPY RXD/TAKEN: ICD-10-PCS | Mod: CPTII,,, | Performed by: INTERNAL MEDICINE

## 2023-02-06 PROCEDURE — 99215 OFFICE O/P EST HI 40 MIN: CPT | Mod: ,,, | Performed by: INTERNAL MEDICINE

## 2023-02-06 PROCEDURE — 3066F NEPHROPATHY DOC TX: CPT | Mod: CPTII,,, | Performed by: INTERNAL MEDICINE

## 2023-02-06 PROCEDURE — 3061F PR NEG MICROALBUMINURIA RESULT DOCUMENTED/REVIEW: ICD-10-PCS | Mod: CPTII,,, | Performed by: INTERNAL MEDICINE

## 2023-02-06 PROCEDURE — 3066F PR DOCUMENTATION OF TREATMENT FOR NEPHROPATHY: ICD-10-PCS | Mod: CPTII,,, | Performed by: INTERNAL MEDICINE

## 2023-02-06 PROCEDURE — 1159F MED LIST DOCD IN RCRD: CPT | Mod: CPTII,,, | Performed by: INTERNAL MEDICINE

## 2023-02-06 PROCEDURE — 4010F ACE/ARB THERAPY RXD/TAKEN: CPT | Mod: CPTII,,, | Performed by: INTERNAL MEDICINE

## 2023-02-06 PROCEDURE — 1159F PR MEDICATION LIST DOCUMENTED IN MEDICAL RECORD: ICD-10-PCS | Mod: CPTII,,, | Performed by: INTERNAL MEDICINE

## 2023-02-06 PROCEDURE — 1160F PR REVIEW ALL MEDS BY PRESCRIBER/CLIN PHARMACIST DOCUMENTED: ICD-10-PCS | Mod: CPTII,,, | Performed by: INTERNAL MEDICINE

## 2023-02-06 PROCEDURE — 1160F RVW MEDS BY RX/DR IN RCRD: CPT | Mod: CPTII,,, | Performed by: INTERNAL MEDICINE

## 2023-02-06 RX ORDER — CIPROFLOXACIN 500 MG/1
500 TABLET ORAL 2 TIMES DAILY
COMMUNITY
Start: 2023-02-03 | End: 2023-06-20

## 2023-02-06 RX ORDER — DICLOFENAC SODIUM 50 MG/1
50 TABLET, DELAYED RELEASE ORAL 2 TIMES DAILY PRN
COMMUNITY
Start: 2023-01-04 | End: 2023-02-06

## 2023-02-06 RX ORDER — ZINC GLUCONATE 50 MG
50 TABLET ORAL DAILY
COMMUNITY

## 2023-02-06 NOTE — ASSESSMENT & PLAN NOTE
Well controlled.   Continue Lisinopril  mg p.o. daily  Low Sodium Diet (DASH Diet - Less than 2 grams of sodium per day).  Monitor blood pressure daily and log. Report consistent numbers greater than 140/90.  Maintain healthy weight with goal BMI <30. Exercise 30 minutes per day, 5 days per week.  Smoking cessation encouraged to aid in BP reduction.

## 2023-02-06 NOTE — ASSESSMENT & PLAN NOTE
-patient advised on the importance of avoiding excessive carbohydrates and sugary food intake and having some form of routine aerobic exercise on a regular basis.  - on metformin 1000 mg p.o. b.i.d., continue

## 2023-02-06 NOTE — ASSESSMENT & PLAN NOTE
-it has been a few years since patient has had a thyroid ultrasound and we will go ahead and recheck to monitor stability of the nodule

## 2023-02-06 NOTE — PROGRESS NOTES
Subjective:      Patient ID: Ailyn Ramos is a 52 y.o. female.    Chief Complaint: Follow-up (4 month for diabetes/)    Ms Robertson is a 49-year-old female who is here today for her follow-up visit for diabetes mellitus type 2.  Only medical comorbidities included thyroid nodules. She gets a thyroid ultrasound completed once a year.  She is due for another repeat ultrasound which will be ordered today   Also patient worried about her cardiac condition and general considering her age and we discussed getting a CT calcium score to further delineate cardiovascular risk factors and she is in agreement.    Hemoglobin A1c is much improved after initiating on metformin.  She has made significant lifestyle modification changes however is encouraged to continue and improve since she is still recognizing a scope to decrease her carbohydrate consumption.      Well: 10/5/22  Pap smear: 07/2022  Mammogram: 11/ 2022  EYE; 10/2022  CRS: 2021    Gynecologist: Dr. Montgomery    The patient's Health Maintenance was reviewed and the following appears to be due at this time:   Health Maintenance Due   Topic Date Due    Pneumococcal Vaccines (Age 0-64) (1 - PCV) Never done    Low Dose Statin  Never done        Past Medical History:  Past Medical History:   Diagnosis Date    Conductive hearing loss     Hypertension     Knee pain     Nontoxic single thyroid nodule     Perforated tympanic membrane     Type 2 diabetes mellitus with diabetic peripheral angiopathy without gangrene, without long-term current use of insulin      Past Surgical History:   Procedure Laterality Date     diagnostic arthroscopic procedures of knee joint      Repair, primary, disrupted ligament, ankle; both collateral ligaments      TYMPANOPLASTY       Review of patient's allergies indicates:  No Known Allergies  Social History     Socioeconomic History    Marital status: Single   Tobacco Use    Smoking status: Never    Smokeless tobacco: Never   Substance and Sexual  "Activity    Alcohol use: Yes     Comment: a few a month    Drug use: Never     Family History   Problem Relation Age of Onset    Stomach cancer Mother         diganosed late 50s early 60s    Breast cancer Mother 73    Breast cancer Sister 54       Review of Systems    A comprehensive review of systems was performed and is negative except for that stated above  Objective:   BP (P) 138/70 (BP Location: Left arm, Patient Position: Sitting, BP Method: Large (Manual))   Pulse (P) 82   Temp (P) 98.8 °F (37.1 °C) (Temporal)   Ht (P) 5' 5" (1.651 m)   Wt (P) 121.1 kg (267 lb)   SpO2 (P) 98%   BMI (P) 44.43 kg/m²     Physical Exam  Constitutional:       Appearance: Normal appearance.   HENT:      Head: Normocephalic and atraumatic.      Nose: Nose normal.      Mouth/Throat:      Mouth: Mucous membranes are moist.      Pharynx: Oropharynx is clear.   Eyes:      Extraocular Movements: Extraocular movements intact.      Pupils: Pupils are equal, round, and reactive to light.   Cardiovascular:      Rate and Rhythm: Normal rate and regular rhythm.      Pulses: Normal pulses.           Dorsalis pedis pulses are 2+ on the right side and 2+ on the left side.   Pulmonary:      Effort: Pulmonary effort is normal.      Breath sounds: Normal breath sounds.   Abdominal:      General: Bowel sounds are normal.      Palpations: Abdomen is soft.   Musculoskeletal:         General: Normal range of motion.      Cervical back: Normal range of motion and neck supple.   Feet:      Right foot:      Protective Sensation: 3 sites tested.  3 sites sensed.      Skin integrity: No ulcer, skin breakdown or erythema.      Toenail Condition: Right toenails are normal.      Left foot:      Protective Sensation: 3 sites tested.  3 sites sensed.      Skin integrity: No ulcer, skin breakdown or erythema.      Toenail Condition: Left toenails are normal.   Skin:     General: Skin is warm.   Neurological:      General: No focal deficit present.      " Mental Status: She is alert and oriented to person, place, and time. Mental status is at baseline.   Psychiatric:         Mood and Affect: Mood normal.     Assessment/ Plan:   1. Encounter for screening for cardiovascular disorders  -     CT Calcium Scoring Cardiac; Future; Expected date: 02/06/2023    2. Primary hypertension  Assessment & Plan:  Well controlled.   Continue Lisinopril  mg p.o. daily  Low Sodium Diet (DASH Diet - Less than 2 grams of sodium per day).  Monitor blood pressure daily and log. Report consistent numbers greater than 140/90.  Maintain healthy weight with goal BMI <30. Exercise 30 minutes per day, 5 days per week.  Smoking cessation encouraged to aid in BP reduction.            3. Nontoxic single thyroid nodule  Assessment & Plan:  -it has been a few years since patient has had a thyroid ultrasound and we will go ahead and recheck to monitor stability of the nodule       Orders:  -     US Soft Tissue Head Neck Thyroid; Future; Expected date: 02/06/2023    4. Type 2 diabetes mellitus with diabetic peripheral angiopathy without gangrene, without long-term current use of insulin  Assessment & Plan:  -patient advised on the importance of avoiding excessive carbohydrates and sugary food intake and having some form of routine aerobic exercise on a regular basis.  - on metformin 1000 mg p.o. b.i.d., continue      5. Mixed hyperlipidemia  Assessment & Plan:  No ASCVD risk.  Patient does have diabetes however currently well controlled and trying lifestyle modification changes.  Statins not indicated at this time  Stressed importance of dietary modifications. Follow a low cholesterol, low saturated fat diet with less that 200mg of cholesterol a day.  Avoid fried foods and high saturated fats (high saturated fats less than 7% of calories).  Add Flax Seed/Fish Oil supplements to diet. Increase dietary fiber.  Regular exercise can reduce LDL and raise HDL. Stressed importance of physical activity 5 times  per week for 30 minutes per day.

## 2023-03-10 PROBLEM — E78.2 MIXED HYPERLIPIDEMIA: Status: ACTIVE | Noted: 2023-03-10

## 2023-03-10 NOTE — ASSESSMENT & PLAN NOTE
No ASCVD risk.  Patient does have diabetes however currently well controlled and trying lifestyle modification changes.  Statins not indicated at this time  Stressed importance of dietary modifications. Follow a low cholesterol, low saturated fat diet with less that 200mg of cholesterol a day.  Avoid fried foods and high saturated fats (high saturated fats less than 7% of calories).  Add Flax Seed/Fish Oil supplements to diet. Increase dietary fiber.  Regular exercise can reduce LDL and raise HDL. Stressed importance of physical activity 5 times per week for 30 minutes per day.

## 2023-03-16 ENCOUNTER — CLINICAL SUPPORT (OUTPATIENT)
Dept: DIABETES | Facility: CLINIC | Age: 53
End: 2023-03-16
Payer: COMMERCIAL

## 2023-03-16 VITALS — HEIGHT: 65 IN | WEIGHT: 269.81 LBS | BODY MASS INDEX: 44.95 KG/M2

## 2023-03-16 DIAGNOSIS — E10.9 TYPE 1 DIABETES MELLITUS WITHOUT COMPLICATION: Primary | ICD-10-CM

## 2023-03-16 PROCEDURE — G0108 PR DIAB MANAGE TRN  PER INDIV: ICD-10-PCS | Mod: ,,, | Performed by: INTERNAL MEDICINE

## 2023-03-16 PROCEDURE — G0108 DIAB MANAGE TRN  PER INDIV: HCPCS | Mod: ,,, | Performed by: INTERNAL MEDICINE

## 2023-03-16 NOTE — PROGRESS NOTES
Diabetes Care Specialist Progress Note  Author: Edith Mauricio RN  Date: 3/16/2023    Program Intake  Reason for Diabetes Program Visit:: Intervention  Type of Intervention:: Individual  Individual: Education  Education: Nutrition and Meal Planning  Current diabetes risk level:: low  In the last 12 months, have you:: none    Lab Results   Component Value Date    HGBA1C 6.8 02/03/2023       Clinical    Patient Health Rating  Compared to other people your age, how would you rate your health?: Fair    Problem Review  Reviewed Problem List with Patient: yes    Clinical Assessment  Current Diabetes Treatment: Oral Medication                   Additional Social History    Support  Does anyone support you with your diabetes care?: yes  Who supports you?: self, friend  Who takes you to your medical appointments?: self  Does the current support meet the patient's needs?: Yes  Is Support an area impacting ability to self-manage diabetes?: No                               Diabetes Self-Management Skills Assessment                   Medications  Diabetes management routine:: oral medications (metformin 1000mg bid)                          Diabetes Self Support Plan         Assessment Summary and Plan    Based on today's diabetes care assessment, the following areas of need were identified:      Social 3/16/2023   Support No   Access to Mass Media/Tech -   Cognitive/Behavioral Health -   Culture/Judaism -   Communication -   Health Literacy -        Clinical 12/28/2022   Medication Adherence No   Lab Compliance No   Nutritional Status No        Diabetes Self-Management Skills 12/28/2022   Diabetes Disease Process/Treatment Options No   Nutrition/Healthy Eating Yes See care plan    Physical Activity/Exercise Yes- see care plan   Medication No- taking medication without side effects   Home Blood Glucose Monitoring No- Reviewed glucose log discussed FBS and A1C goals.  See attached log.    Acute Complications No- denies  hypoglycemia   Chronic Complications No- Discussed chronic complications and benefits of goal A1C of <7 or <6.5 She is currently at 6.8   Psychosocial/Coping No            Today's interventions were provided through individual discussion, instruction, and written materials were provided.      Patient verbalized understanding of instruction and written materials.  Pt was able to return back demonstration of instructions today. Patient understood key points, needs reinforcement and further instruction.     Diabetes Self-Management Care Plan:    Today's Diabetes Self-Management Care Plan was developed with Ailyn's input. Ailyn has agreed to work toward the following goal(s) to improve his/her overall diabetes control.      Care Plan: Diabetes Management   Updates made since 2/14/2023 12:00 AM        Problem: Healthy Eating         Goal: Eat 3 meals daily with 30-45 g of Carbohydrate per meal.    Start Date: 11/14/2022   Expected End Date: 2/6/2023   Recent Progress: Not met   Priority: High   Barriers: No Barriers Identified   Note:    12/28/22: Pt has been doing a good job of carb counting and trying to stay within rec carb limits.  Does admit to falling off the wagon a bit during the holidays, but is motivated to get back on track      Pt works as a : 28/14 schedule.  She does have some control over which foods are available when she is at work.   Admits to not having the best eating habits, but has trying to make better choices.  Not eating as many snack type foods (specifically [employer]) and incorporating more fruit in to her diet.  B: oatmeal with fruit or an egg  sandwich, L and D: variety of prepared meals.   May snack on apples with cinnamon, fruit cup or baked chips.    Educated on carb counting with goal consistency at meals. Aim for no more than 30-45g carbs/meal,15 g carbs/snack.  Be sure to incorporate protein when eating.  Gave list of healthy snack ideas and rec using plate method  for meal planning.  3/16/23 She is getting tired of Turkey burgers.  Food photos used to assist in meal planning, gave copy of snack ideas, food exchange list and discussed ways to get more non-starchy vegetables into meals.        Problem: Physical Activity and Exercise         Goal: Patient agrees to increase physical activity to a goal of 7 times per week for 10 minutes.    Start Date: 12/28/2022   Expected End Date: 2/6/2023   Recent Progress: Not met   Priority: High   Barriers: Other (comments)   Note:    12/28/22:  Pt has not been exercising.  Reports her knee is feeling a lot better and feels she can start.  Her morning CBG's are a little above goal; exercise and weight loss will help.  Goal is to walk for at least 10 minutes every day  3/16/23 Not exercising yet but joined gym for when she is home.  Discussed ways to add exercise while at home and at work, suggested fit on justine on phone, walk, weights and bike riding. She wants to recommit to her goal and encouraged her to start slow to build routine and can add more later.  Discussed exercise as a stress relief for her as well.        Task: Offered suggestions on how patient could increase their regular physical activity Completed 3/16/2023        Task: Reviewed blood glucose monitoring before, during and after exercise/activity Completed 3/16/2023          Follow Up Plan     Follow up in about 6 weeks (around 4/27/2023) for glucose monitoring . Appt made with pt for next time she in is off work, May 1 2023 at 1000.  Plan to review nutrition, glucose log, exercise goal, discuss doing food log if needed and DZ process.     Today's care plan and follow up schedule was discussed with patient.  Ailyn verbalized understanding of the care plan, goals, and agrees to follow up plan.        The patient was encouraged to communicate with his/her health care provider/physician and care team regarding his/her condition(s) and treatment.  I provided the patient with my  contact information today and encouraged to contact me via phone or [facility] Patient Portal as needed.     Length of Visit   Total Time: 60 Minutes

## 2023-05-17 DIAGNOSIS — E11.51 TYPE 2 DIABETES MELLITUS WITH DIABETIC PERIPHERAL ANGIOPATHY WITHOUT GANGRENE, WITHOUT LONG-TERM CURRENT USE OF INSULIN: Primary | ICD-10-CM

## 2023-06-06 ENCOUNTER — TELEPHONE (OUTPATIENT)
Dept: ADMINISTRATIVE | Facility: HOSPITAL | Age: 53
End: 2023-06-06
Payer: COMMERCIAL

## 2023-06-06 NOTE — TELEPHONE ENCOUNTER
----- Message from Kitty Fraga MA sent at 5/17/2023  7:52 AM CDT -----  Regarding: Dr BRITTNEY LAL Friday 6-16-23       1. Are there any outstanding Labs, imaging or referrals in the patient's chart?        4 month follow up for diabetes    Non fasting labs ordered and ready to do    Last wellness 10-5-22         2. . Has the patient been seen in an ER, urgent care clinic, or any other health care    provider since their last visit? If yes when and where?

## 2023-06-13 ENCOUNTER — TELEPHONE (OUTPATIENT)
Dept: ADMINISTRATIVE | Facility: HOSPITAL | Age: 53
End: 2023-06-13
Payer: COMMERCIAL

## 2023-06-13 NOTE — TELEPHONE ENCOUNTER
----- Message from Kitty Fraga MA sent at 6/13/2023  2:07 PM CDT -----  Regarding: Dr BRITTNEY LAL Tuesday 6-20-23       1. Are there any outstanding Labs, imaging or referrals in the patient's chart?      4 month follow up for diabetes    Non fasting lab ordered and ready to do    Last wellness 10-5-22           2. . Has the patient been seen in an ER, urgent care clinic, or any other health care    provider since their last visit? If yes when and where?

## 2023-06-19 NOTE — PROGRESS NOTES
TELEMEDICINE VISIT     Patient ID: Ailyn Ramos is a 52 y.o. female.  MRN: [id number]  : 1970    Subjective:        TELEMEDICINE  The patient location is: home  The chief complaint leading to consultation is: Follow-up (4 month diabetes/)     Visit type: Virtual visit with synchronous audio and video    Total time spent with patient: 30 minutes  35 minutes of total time spent on the encounter, which includes face to face time and non-face to face time preparing to see the patient (eg, review of tests), obtaining and/or reviewing separately obtained history, documenting clinical information in the electronic or other health record, independently interpreting results (not separately reported) and communicating results to the patient/family/caregiver, or care coordination (not separately reported).    Each patient to whom he or she provides medical services by telemedicine is:  (1) informed of the relationship between the physician and patient and the respective role of any other health care provider with respect to management of the patient; and (2) notified that he or she may decline to receive medical services by telemedicine and may withdraw from such care at any time.    HPI: Ms Robertson is a 52-year-old female seen today via telemedicine for her follow-up.   Only medical comorbidities include diabetes mellitus type 2, obesity, thyroid nodules.   CT calcium score from 2023 noted to be at 0    Hemoglobin A1c went up currently on metformin.  Patient admits to not watching her diet or exercising.  She does work offshore.  We discussed getting her started on Mounjaro and she is in agreement.  We will initiate with 2.5 mg subQ weekly X 4 weeks, followed by 5 mg subQ weekly thereafter.    Emphasized on the importance of diet control and carbohydrate reduction     Well: 10/5/22  Pap smear: 2022  Mammogram: 2022  EYE; 10/2022  CRS:      Gynecologist:  [facility]  "reviewed with the patient.  Health maintenance completed:  Health Maintenance Topics with due status: Not Due       Topic Last Completion Date    Colorectal Cancer Screening 01/13/2021    Cervical Cancer Screening 07/12/2022    Lipid Panel 10/03/2022    Eye Exam 10/10/2022    Mammogram 11/15/2022    Diabetes Urine Screening 02/03/2023    Foot Exam 02/06/2023    Hemoglobin A1c 06/19/2023      Health maintenance due:  Health Maintenance Due   Topic Date Due    Pneumococcal Vaccines (Age 0-64) (1 - PCV) Never done    Low Dose Statin  Never done      ROS:  Review of Systems   A comprehensive review of systems is obtained and is essentially negative except for that stated in the HPI   History:     Past Medical History:   Diagnosis Date    Conductive hearing loss     Hypertension     Knee pain     Nontoxic single thyroid nodule     Perforated tympanic membrane     Type 2 diabetes mellitus with diabetic peripheral angiopathy without gangrene, without long-term current use of insulin       Past Surgical History:   Procedure Laterality Date     diagnostic arthroscopic procedures of knee joint      Repair, primary, disrupted ligament, ankle; both collateral ligaments      TYMPANOPLASTY       Family History   Problem Relation Age of Onset    Stomach cancer Mother         diganosed late 50s early 60s    Breast cancer Mother 73    Diabetes Father     Breast cancer Sister 54      Social History     Tobacco Use    Smoking status: Never    Smokeless tobacco: Never   Substance and Sexual Activity    Alcohol use: Yes     Alcohol/week: 6.0 standard drinks     Types: 6 Cans of beer per week     Comment: Social drinker sometimes I go months and dont drink at all    Drug use: Not Currently    Sexual activity: Not Currently     Birth control/protection: Abstinence          Allergies: Review of patient's allergies indicates:  No Known Allergies  Objective:     Vitals:    06/20/23 1510   BP: 138/70   Weight: 122 kg (269 lb)   Height: 5' 5" " (1.651 m)   PainSc: 0-No pain         Physical Examination: Physical exam was not performed during this virtual visit.  Physical Exam  General:  Alert oriented, no acute distress , obese    Labs:   Diagnostic Tests:  Significant Imaging: I have reviewed and interpreted all pertinent imaging results/findings.  CT Calcium Scoring Cardiac  Narrative: EXAMINATION:  CT CALCIUM SCORING CARDIAC    CLINICAL HISTORY:  CAD screening, intermediate CAD risk, treadmill candidate;  Encounter for screening for cardiovascular disorders    TECHNIQUE:  Thin slice gated non-contrast CT imaging was performed through the cardiac region for assessment of coronary artery atherosclerotic calcification per routine protocol.  Automatic dose modulation and/or weight based mA/kv utilized to achieve as low as reasonable radiation dose.    COMPARISON:  None.    FINDINGS:  Important information about your scan:    The following information is based on analysis of the coronary arteries only.  Calcium deposits do not correspond directly to the percentage of narrowing of the arteries.  They do correlate directly to the amount of coronary plaque, and to the risk of future coronary disease.  These calcium deposits usually begin to form years before any symptoms develop.  Early detection and modification of risk factors, such as smoking and cholesterol intake, and slow progression of coronary artery disease.    A low score suggests a low likelihood of coronary artery disease, but does not exclude the possibility of significant coronary artery narrowing.  The results should be discussed with your physician taking into account other risk factors such as age, gender, family history, diabetes, smoking or high cholesterol levels.    Should you experience chest pain, difficulty breathing, discomfort radiating into her neck or arm, or discomfort combined with lightheadedness, sweating, fainting or nausea, you should seek prompt medical attention.    Calcium  score:    0-10: Very little coronary heart disease risk    11 through 100: Low to moderate coronary heart disease risk    101 through 400: Moderate to high coronary heart disease risk    Greater than 401: High coronary heart disease risk.    SCORE SUMMARY    Your total calcium score is 0.  No atherosclerotic calcification is identified along the left main, left anterior descending, circumflex, right, or posterior descending coronary arteries.  The heart and great vessels are normal in size without pericardial effusion.  Diffuse hepatic steatosis is noted.  No acute pathology or additional unexpected abnormality is identified at the visualized chest and abdomen.  Impression: Your total calcium score is 0.  No coronary artery atherosclerotic calcification identified.  The risk of coronary artery disease is very low, generally less than 5%.    Diffuse hepatic steatosis.    Electronically signed by: Wayne Agudelo  Date:    03/20/2023  Time:    12:15  US Soft Tissue Head Neck Thyroid  Narrative: EXAMINATION:  US SOFT TISSUE HEAD NECK THYROID    CLINICAL HISTORY:  Nontoxic single thyroid nodule    TECHNIQUE:  Real-time grayscale and color Doppler sonographic evaluation of the thyroid gland was performed per routine protocol.    COMPARISON:  Thyroid ultrasound 01/08/2021 and additional thyroid ultrasound exams dating back to 03/22/2017.    FINDINGS:  The thyroid gland is normal in size and contour.  The right thyroid lobe measures 5.4 x 1.6 x 1.7 cm and the left lobe 5.3 x 1.8 x 1.9 cm.  The isthmus measures just under 0.5 cm in AP thickness and is normal.    There are several subcentimeter simple colloid cysts scattered throughout both lobes of the thyroid gland.  A small solid and hypoechoic nodule at the right posterior mid thyroid lobe measures 0.8 x 0.6 x 1.2 cm.  This nodule is only slightly enlarged dating back to a thyroid ultrasound in 2017 (previously 1 cm in greatest dimension).    There is a small 2 x 3 mm  solid hypoechoic nodule at the left posterior mid thyroid lobe.    No large solid nodule or thyroid calcifications.  There is normal color Doppler vascular flow throughout the thyroid gland with no hypervascular nodules.  Impression: Normal size thyroid gland with small nodular scattered throughout both lobes and no new nodule identified.  The largest thyroid nodule is a solid hypoechoic nodule at the posterior right mid thyroid lobe measuring 1.2 cm in greatest dimension which is only minimally enlarged dating back to a thyroid ultrasound 03/22/2017.    Electronically signed by: Wayne Agudelo  Date:    03/20/2023  Time:    10:43      Laboratory Data:  All pertinent labs have been reviewed.  I have reviewed the following records today:     Details:   [x] Labs [] Internal  [] External    [] Micro [] Internal  [] External    [] Pathology [] Internal  [] External    [x] Imaging [] Internal  [] External    [x] Cardiology Procedures [] Internal  [] External    [x] Provider Records [] Internal  [] External    [] Other [] Internal  [] External      Labs:   Lab Results   Component Value Date    WBC 9.2 10/03/2022    RBC 5.29 10/03/2022    HGB 15.4 10/03/2022    HCT 45.9 10/03/2022    MCV 86.8 10/03/2022    MCH 29.1 10/03/2022     10/03/2022     10/03/2022    K 4.6 10/03/2022    BUN 14.7 10/03/2022    CREATININE 0.74 10/03/2022    AST 28 10/03/2022    ALT 57 (H) 10/03/2022    BILITOT 0.9 10/03/2022    TSH 0.8188 10/03/2022    HGBA1C 7.9 (H) 06/19/2023      Medications:     Medication List with Changes/Refills   New Medications    TIRZEPATIDE (MOUNJARO) 5 MG/0.5 ML PNIJ    Inject 5 mg into the skin every 7 days.   Current Medications    ASCORBIC ACID, VITAMIN C, (VITAMIN C) 100 MG TABLET    Take 100 mg by mouth once daily.    ASPIRIN (ECOTRIN) 81 MG EC TABLET    Take 81 mg by mouth Daily.    BLOOD SUGAR DIAGNOSTIC STRP    To check BG 1 times daily, to use with insurance preferred meter    BLOOD-GLUCOSE METER KIT     To check BG 1 times daily, to use with insurance preferred meter    FLUTICASONE PROPIONATE (FLONASE) 50 MCG/ACTUATION NASAL SPRAY    1 spray by Each Nostril route once daily. PRN    GINKGO BILOBA 120 MG TAB    Take 1 tablet by mouth Daily.    LANCETS MISC    To check BG 1 times daily, to use with insurance preferred meter    MECOBALAMIN (B12 ACTIVE ORAL)    Take 1 tablet by mouth Daily.    METFORMIN (GLUCOPHAGE) 1000 MG TABLET    Take 1 tablet (1,000 mg total) by mouth 2 (two) times daily with meals.    MULTIVITAMIN (THERAGRAN) PER TABLET    Take 1 tablet by mouth once daily.    TUMERIC-GING-OLIVE-OREG-CAPRYL 100 MG-150 MG- 50 MG-150 MG CAP    Take 1 tablet by mouth once daily.    VITAMIN D (VITAMIN D3) 1000 UNITS TAB    Take 1,000 Units by mouth Daily.    ZINC GLUCONATE 50 MG TABLET    Take 50 mg by mouth once daily.   Changed and/or Refilled Medications    Modified Medication Previous Medication    LISINOPRIL (PRINIVIL,ZESTRIL) 5 MG TABLET lisinopriL (PRINIVIL,ZESTRIL) 5 MG tablet       Take 1 tablet (5 mg total) by mouth once daily.    Take 1 tablet (5 mg total) by mouth once daily.   Discontinued Medications    CIPROFLOXACIN HCL (CIPRO) 500 MG TABLET    Take 500 mg by mouth 2 (two) times daily.    PREDNISONE (DELTASONE) 10 MG TABLET    Take 20 mg by mouth Daily.     Assessment:     1. Type 2 diabetes mellitus with diabetic peripheral angiopathy without gangrene, without long-term current use of insulin    2. Primary hypertension    3. Mixed hyperlipidemia      Plan:   Ailyn was seen today for follow-up.    Diagnoses and all orders for this visit:    Type 2 diabetes mellitus with diabetic peripheral angiopathy without gangrene, without long-term current use of insulin  -     tirzepatide (MOUNJARO) 5 mg/0.5 mL PnIj; Inject 5 mg into the skin every 7 days.  -patient advised on the importance of avoiding excessive carbohydrates and sugary food intake and having some form of routine aerobic exercise on a regular  basis.   - Initiating on Tirzepatide at 2.5 mg SQ weekly,   -patient to come and pick samples tomorrow   -prescription for 5 milligram subQ weekly sent to the pharmacy, hopefully it will be approved by insurance and patient will have benefit for weight loss as well as improving her diabetes mellitus type 2.    -current HbA1c is noted to be at 7.9    Primary hypertension  Well controlled.   Continue Lisinopril 5 mg po daily, Rx renewed  Low Sodium Diet (DASH Diet - Less than 2 grams of sodium per day).  Monitor blood pressure daily and log. Report consistent numbers greater than 140/90.  Maintain healthy weight with goal BMI <30. Exercise 30 minutes per day, 5 days per week.  Smoking cessation encouraged to aid in BP reduction.         Mixed hyperlipidemia    Stressed importance of dietary modifications. Follow a low cholesterol, low saturated fat diet with less that 200mg of cholesterol a day.  Avoid fried foods and high saturated fats (high saturated fats less than 7% of calories).  Add Flax Seed/Fish Oil supplements to diet. Increase dietary fiber.  Regular exercise can reduce LDL and raise HDL. Stressed importance of physical activity 5 times per week for 30 minutes per day.      Morbid obesity with BMI of 44.76  Body mass index is 44.76 kg/m².  Goal BMI <30.  Exercise 5 times a week for 30 minutes per day.  Avoid soda, simple sugars, excessive rice, potatoes or bread. Limit fast foods and fried foods.  Choose complex carbs in moderation (example: green vegetables, beans, oatmeal). Eat plenty of fresh fruits and vegetables with lean meats daily.  Do not skip meals. Eat a balanced portion size.  Avoid fad diets. Consider permanent healthy life style changes.      Other orders  -     lisinopriL (PRINIVIL,ZESTRIL) 5 MG tablet; Take 1 tablet (5 mg total) by mouth once daily.        Follow Up:   No follow-ups on file.    I spent greater than 35 minutes today both in chart review and greater than 50% of that time in  discussion with the patient regarding health maintenance, diagnoses, diagnostic tests, medications, treatments, symptom management, expected results and adverse effects. Patient verbalized understanding and all questions were answered.

## 2023-06-20 ENCOUNTER — OFFICE VISIT (OUTPATIENT)
Dept: INTERNAL MEDICINE | Facility: CLINIC | Age: 53
End: 2023-06-20
Payer: COMMERCIAL

## 2023-06-20 VITALS
WEIGHT: 269 LBS | SYSTOLIC BLOOD PRESSURE: 138 MMHG | HEIGHT: 65 IN | DIASTOLIC BLOOD PRESSURE: 70 MMHG | BODY MASS INDEX: 44.82 KG/M2

## 2023-06-20 DIAGNOSIS — E11.51 TYPE 2 DIABETES MELLITUS WITH DIABETIC PERIPHERAL ANGIOPATHY WITHOUT GANGRENE, WITHOUT LONG-TERM CURRENT USE OF INSULIN: Primary | ICD-10-CM

## 2023-06-20 DIAGNOSIS — I10 PRIMARY HYPERTENSION: ICD-10-CM

## 2023-06-20 DIAGNOSIS — E78.2 MIXED HYPERLIPIDEMIA: ICD-10-CM

## 2023-06-20 PROCEDURE — 3066F PR DOCUMENTATION OF TREATMENT FOR NEPHROPATHY: ICD-10-PCS | Mod: CPTII,95,, | Performed by: INTERNAL MEDICINE

## 2023-06-20 PROCEDURE — 99214 OFFICE O/P EST MOD 30 MIN: CPT | Mod: 95,,, | Performed by: INTERNAL MEDICINE

## 2023-06-20 PROCEDURE — 1159F PR MEDICATION LIST DOCUMENTED IN MEDICAL RECORD: ICD-10-PCS | Mod: CPTII,95,, | Performed by: INTERNAL MEDICINE

## 2023-06-20 PROCEDURE — 3075F PR MOST RECENT SYSTOLIC BLOOD PRESS GE 130-139MM HG: ICD-10-PCS | Mod: CPTII,95,, | Performed by: INTERNAL MEDICINE

## 2023-06-20 PROCEDURE — 3078F DIAST BP <80 MM HG: CPT | Mod: CPTII,95,, | Performed by: INTERNAL MEDICINE

## 2023-06-20 PROCEDURE — 99214 PR OFFICE/OUTPT VISIT, EST, LEVL IV, 30-39 MIN: ICD-10-PCS | Mod: 95,,, | Performed by: INTERNAL MEDICINE

## 2023-06-20 PROCEDURE — 1160F RVW MEDS BY RX/DR IN RCRD: CPT | Mod: CPTII,95,, | Performed by: INTERNAL MEDICINE

## 2023-06-20 PROCEDURE — 1160F PR REVIEW ALL MEDS BY PRESCRIBER/CLIN PHARMACIST DOCUMENTED: ICD-10-PCS | Mod: CPTII,95,, | Performed by: INTERNAL MEDICINE

## 2023-06-20 PROCEDURE — 3075F SYST BP GE 130 - 139MM HG: CPT | Mod: CPTII,95,, | Performed by: INTERNAL MEDICINE

## 2023-06-20 PROCEDURE — 3008F BODY MASS INDEX DOCD: CPT | Mod: CPTII,95,, | Performed by: INTERNAL MEDICINE

## 2023-06-20 PROCEDURE — 3066F NEPHROPATHY DOC TX: CPT | Mod: CPTII,95,, | Performed by: INTERNAL MEDICINE

## 2023-06-20 PROCEDURE — 3008F PR BODY MASS INDEX (BMI) DOCUMENTED: ICD-10-PCS | Mod: CPTII,95,, | Performed by: INTERNAL MEDICINE

## 2023-06-20 PROCEDURE — 4010F PR ACE/ARB THEARPY RXD/TAKEN: ICD-10-PCS | Mod: CPTII,95,, | Performed by: INTERNAL MEDICINE

## 2023-06-20 PROCEDURE — 4010F ACE/ARB THERAPY RXD/TAKEN: CPT | Mod: CPTII,95,, | Performed by: INTERNAL MEDICINE

## 2023-06-20 PROCEDURE — 3061F NEG MICROALBUMINURIA REV: CPT | Mod: CPTII,95,, | Performed by: INTERNAL MEDICINE

## 2023-06-20 PROCEDURE — 3061F PR NEG MICROALBUMINURIA RESULT DOCUMENTED/REVIEW: ICD-10-PCS | Mod: CPTII,95,, | Performed by: INTERNAL MEDICINE

## 2023-06-20 PROCEDURE — 3078F PR MOST RECENT DIASTOLIC BLOOD PRESSURE < 80 MM HG: ICD-10-PCS | Mod: CPTII,95,, | Performed by: INTERNAL MEDICINE

## 2023-06-20 PROCEDURE — 1159F MED LIST DOCD IN RCRD: CPT | Mod: CPTII,95,, | Performed by: INTERNAL MEDICINE

## 2023-06-20 RX ORDER — LISINOPRIL 5 MG/1
5 TABLET ORAL DAILY
Qty: 90 TABLET | Refills: 3 | Status: SHIPPED | OUTPATIENT
Start: 2023-06-20 | End: 2023-08-07

## 2023-06-20 RX ORDER — TIRZEPATIDE 5 MG/.5ML
5 INJECTION, SOLUTION SUBCUTANEOUS
Qty: 4 PEN | Refills: 5 | Status: SHIPPED | OUTPATIENT
Start: 2023-06-20 | End: 2023-10-17

## 2023-06-20 NOTE — ASSESSMENT & PLAN NOTE
-patient advised on the importance of avoiding excessive carbohydrates and sugary food intake and having some form of routine aerobic exercise on a regular basis.  -

## 2023-06-22 ENCOUNTER — TELEPHONE (OUTPATIENT)
Dept: INTERNAL MEDICINE | Facility: CLINIC | Age: 53
End: 2023-06-22
Payer: COMMERCIAL

## 2023-06-22 DIAGNOSIS — E11.51 TYPE 2 DIABETES MELLITUS WITH DIABETIC PERIPHERAL ANGIOPATHY WITHOUT GANGRENE, WITHOUT LONG-TERM CURRENT USE OF INSULIN: Primary | ICD-10-CM

## 2023-06-22 RX ORDER — SEMAGLUTIDE 0.68 MG/ML
0.5 INJECTION, SOLUTION SUBCUTANEOUS
Qty: 12 EACH | Refills: 3 | Status: SHIPPED | OUTPATIENT
Start: 2023-06-22 | End: 2024-02-21

## 2023-06-22 NOTE — TELEPHONE ENCOUNTER
----- Message from Maddie Artis sent at 6/22/2023 11:19 AM CDT -----  Regarding: med adv  Type:  Needs Medical Advice    Who Called: Ailyn  Pharmacy name and phone #:  [facility] [address]   Would the patient rather a call back or a response via [facility]?   Best Call Back Number: [phone]  Additional Information: insurance does not cover Mounjaro but it will cover Truilicity or Ozempic without a PA however once these two former meds are tried and has no success then provider may appeal to prescribe Mounjaro. Please call patient back.

## 2023-07-24 ENCOUNTER — OFFICE VISIT (OUTPATIENT)
Dept: SURGERY | Facility: CLINIC | Age: 53
End: 2023-07-24
Payer: COMMERCIAL

## 2023-07-24 VITALS
RESPIRATION RATE: 18 BRPM | SYSTOLIC BLOOD PRESSURE: 119 MMHG | BODY MASS INDEX: 42.79 KG/M2 | WEIGHT: 256.81 LBS | HEART RATE: 77 BPM | HEIGHT: 65 IN | TEMPERATURE: 98 F | DIASTOLIC BLOOD PRESSURE: 81 MMHG | OXYGEN SATURATION: 97 %

## 2023-07-24 DIAGNOSIS — Z12.31 SCREENING MAMMOGRAM FOR HIGH-RISK PATIENT: ICD-10-CM

## 2023-07-24 DIAGNOSIS — Z91.89 AT HIGH RISK FOR BREAST CANCER: Primary | ICD-10-CM

## 2023-07-24 DIAGNOSIS — Z80.3 FAMILY HISTORY OF BREAST CANCER: ICD-10-CM

## 2023-07-24 PROCEDURE — 3079F PR MOST RECENT DIASTOLIC BLOOD PRESSURE 80-89 MM HG: ICD-10-PCS | Mod: CPTII,S$GLB,, | Performed by: STUDENT IN AN ORGANIZED HEALTH CARE EDUCATION/TRAINING PROGRAM

## 2023-07-24 PROCEDURE — 4010F ACE/ARB THERAPY RXD/TAKEN: CPT | Mod: CPTII,S$GLB,, | Performed by: STUDENT IN AN ORGANIZED HEALTH CARE EDUCATION/TRAINING PROGRAM

## 2023-07-24 PROCEDURE — 1160F PR REVIEW ALL MEDS BY PRESCRIBER/CLIN PHARMACIST DOCUMENTED: ICD-10-PCS | Mod: CPTII,S$GLB,, | Performed by: STUDENT IN AN ORGANIZED HEALTH CARE EDUCATION/TRAINING PROGRAM

## 2023-07-24 PROCEDURE — 99999 PR PBB SHADOW E&M-EST. PATIENT-LVL V: ICD-10-PCS | Mod: PBBFAC,,, | Performed by: STUDENT IN AN ORGANIZED HEALTH CARE EDUCATION/TRAINING PROGRAM

## 2023-07-24 PROCEDURE — 3008F BODY MASS INDEX DOCD: CPT | Mod: CPTII,S$GLB,, | Performed by: STUDENT IN AN ORGANIZED HEALTH CARE EDUCATION/TRAINING PROGRAM

## 2023-07-24 PROCEDURE — 3066F NEPHROPATHY DOC TX: CPT | Mod: CPTII,S$GLB,, | Performed by: STUDENT IN AN ORGANIZED HEALTH CARE EDUCATION/TRAINING PROGRAM

## 2023-07-24 PROCEDURE — 3074F SYST BP LT 130 MM HG: CPT | Mod: CPTII,S$GLB,, | Performed by: STUDENT IN AN ORGANIZED HEALTH CARE EDUCATION/TRAINING PROGRAM

## 2023-07-24 PROCEDURE — 1160F RVW MEDS BY RX/DR IN RCRD: CPT | Mod: CPTII,S$GLB,, | Performed by: STUDENT IN AN ORGANIZED HEALTH CARE EDUCATION/TRAINING PROGRAM

## 2023-07-24 PROCEDURE — 3079F DIAST BP 80-89 MM HG: CPT | Mod: CPTII,S$GLB,, | Performed by: STUDENT IN AN ORGANIZED HEALTH CARE EDUCATION/TRAINING PROGRAM

## 2023-07-24 PROCEDURE — 3061F NEG MICROALBUMINURIA REV: CPT | Mod: CPTII,S$GLB,, | Performed by: STUDENT IN AN ORGANIZED HEALTH CARE EDUCATION/TRAINING PROGRAM

## 2023-07-24 PROCEDURE — 99999 PR PBB SHADOW E&M-EST. PATIENT-LVL V: CPT | Mod: PBBFAC,,, | Performed by: STUDENT IN AN ORGANIZED HEALTH CARE EDUCATION/TRAINING PROGRAM

## 2023-07-24 PROCEDURE — 3051F HG A1C>EQUAL 7.0%<8.0%: CPT | Mod: CPTII,S$GLB,, | Performed by: STUDENT IN AN ORGANIZED HEALTH CARE EDUCATION/TRAINING PROGRAM

## 2023-07-24 PROCEDURE — 99214 PR OFFICE/OUTPT VISIT, EST, LEVL IV, 30-39 MIN: ICD-10-PCS | Mod: S$GLB,,, | Performed by: STUDENT IN AN ORGANIZED HEALTH CARE EDUCATION/TRAINING PROGRAM

## 2023-07-24 PROCEDURE — 3051F PR MOST RECENT HEMOGLOBIN A1C LEVEL 7.0 - < 8.0%: ICD-10-PCS | Mod: CPTII,S$GLB,, | Performed by: STUDENT IN AN ORGANIZED HEALTH CARE EDUCATION/TRAINING PROGRAM

## 2023-07-24 PROCEDURE — 3061F PR NEG MICROALBUMINURIA RESULT DOCUMENTED/REVIEW: ICD-10-PCS | Mod: CPTII,S$GLB,, | Performed by: STUDENT IN AN ORGANIZED HEALTH CARE EDUCATION/TRAINING PROGRAM

## 2023-07-24 PROCEDURE — 1159F MED LIST DOCD IN RCRD: CPT | Mod: CPTII,S$GLB,, | Performed by: STUDENT IN AN ORGANIZED HEALTH CARE EDUCATION/TRAINING PROGRAM

## 2023-07-24 PROCEDURE — 99214 OFFICE O/P EST MOD 30 MIN: CPT | Mod: S$GLB,,, | Performed by: STUDENT IN AN ORGANIZED HEALTH CARE EDUCATION/TRAINING PROGRAM

## 2023-07-24 PROCEDURE — 1159F PR MEDICATION LIST DOCUMENTED IN MEDICAL RECORD: ICD-10-PCS | Mod: CPTII,S$GLB,, | Performed by: STUDENT IN AN ORGANIZED HEALTH CARE EDUCATION/TRAINING PROGRAM

## 2023-07-24 PROCEDURE — 3074F PR MOST RECENT SYSTOLIC BLOOD PRESSURE < 130 MM HG: ICD-10-PCS | Mod: CPTII,S$GLB,, | Performed by: STUDENT IN AN ORGANIZED HEALTH CARE EDUCATION/TRAINING PROGRAM

## 2023-07-24 PROCEDURE — 3066F PR DOCUMENTATION OF TREATMENT FOR NEPHROPATHY: ICD-10-PCS | Mod: CPTII,S$GLB,, | Performed by: STUDENT IN AN ORGANIZED HEALTH CARE EDUCATION/TRAINING PROGRAM

## 2023-07-24 PROCEDURE — 4010F PR ACE/ARB THEARPY RXD/TAKEN: ICD-10-PCS | Mod: CPTII,S$GLB,, | Performed by: STUDENT IN AN ORGANIZED HEALTH CARE EDUCATION/TRAINING PROGRAM

## 2023-07-24 PROCEDURE — 3008F PR BODY MASS INDEX (BMI) DOCUMENTED: ICD-10-PCS | Mod: CPTII,S$GLB,, | Performed by: STUDENT IN AN ORGANIZED HEALTH CARE EDUCATION/TRAINING PROGRAM

## 2023-07-24 NOTE — PROGRESS NOTES
[facility] - [facility] [place] [facility]  Breast Surgical Oncology  Established Patient Office Visit - H&P      Referring Provider:  No ref. provider found     PCP: Dr. Sawyer     Chief Complaint:   Chief Complaint   Patient presents with    Follow-up     Patient is present for a 4 month high risk follow up visit. No breast issues to report as of today.      Subjective:      Interval: Ailyn Ramos is a 52 y.o.female who presents today for high risk FU. She denies any breast changes including new masses, skin changes, skin lesions, pain, nipple discharge or retraction.  She had her Bilateral screening mammogram in 2022 which was negative. She was not able to tolerate her MRI.  She does not do a regular SBE. She read and thought about chemoprevention since her last visit and she is not interested.   No change in family history since her last visit. She was adopted but has most knowledge of her family history.  She is taking Ozempic and has lost about 18lbs since her last visit, but due to diarrhea she doesn't know if she will continue it. She is a .    History of Present Illness:  Ailyn Ramos  is a pleasant female patient who initially presents on  2/3/2023 at 52 y.o. years old for evaluation and assessment of risk for breast cancer based on  the Tyrer - Cuzick Breast Cancer Risk Model (> 20% indicates elevated lifetime risk). Her lifetime risk is calculated to be 33.3%,.  Her 5 year Griselda score is 3.8%.   Her family history is significant for her mom with breast cancer at 73 and sister breast cancer at 54.   No breast surgeries or biopsies in the past.     Imagin/15/2022 SCR MG at [facility]- BIRADS 1. Density B. No significant masses, calcifications, or other findings are seen in either breast.  There has been no significant interval change.    Pathology: none      OB / GYN History   Menarche Onset:12  Menopause: Menopause: postmenopausal at 51  Hormonal birth  control (duration): 4years  Pregnancies: 0  Age at first child birth: n/a   Child births: 0  Hysterectomy/Oophorectomy: n/a   HRT: no    Family History of Cancer (& age at diagnosis):  -   Family History   Problem Relation Age of Onset    Stomach cancer Mother         diganosed late 50s early 60s    Breast cancer Mother 73    Diabetes Father     Breast cancer Sister 54        Lifestyle:  Height and Weight: 5'5, 273  BMI: Body mass index is 42.73 kg/m².     Other:  # of breast biopsies (when and pathology results): 0  MG breast density BIRADS B  Prior thoracic RT: none  Genetic testing: no  Ashkenazi Advent descent: No    Other History:     Past Medical History:   Diagnosis Date    Conductive hearing loss     Hypertension     Knee pain     Nontoxic single thyroid nodule     Perforated tympanic membrane     Type 2 diabetes mellitus with diabetic peripheral angiopathy without gangrene, without long-term current use of insulin         Past Surgical History:   Procedure Laterality Date     diagnostic arthroscopic procedures of knee joint      Repair, primary, disrupted ligament, ankle; both collateral ligaments      TYMPANOPLASTY          Social History     Socioeconomic History    Marital status: Single   Tobacco Use    Smoking status: Never    Smokeless tobacco: Never   Substance and Sexual Activity    Alcohol use: Yes     Alcohol/week: 6.0 standard drinks     Types: 6 Cans of beer per week     Comment: Social drinker sometimes I go months and dont drink at all    Drug use: Not Currently    Sexual activity: Not Currently     Birth control/protection: Abstinence     Social Determinants of Health     Financial Resource Strain: Low Risk     Difficulty of Paying Living Expenses: Not hard at all   Food Insecurity: No Food Insecurity    Worried About Running Out of Food in the Last Year: Never true    Ran Out of Food in the Last Year: Never true   Transportation Needs: No Transportation Needs    Lack of Transportation  (Medical): No    Lack of Transportation (Non-Medical): No   Physical Activity: Insufficiently Active    Days of Exercise per Week: 4 days    Minutes of Exercise per Session: 30 min   Stress: No Stress Concern Present    Feeling of Stress : Not at all   Social Connections: Unknown    Frequency of Communication with Friends and Family: More than three times a week    Frequency of Social Gatherings with Friends and Family: More than three times a week    Attends Alevism Services: Patient refused    Active Member of Clubs or Organizations: No    Attends Club or Organization Meetings: Never    Marital Status: Never    Housing Stability: Low Risk     Unable to Pay for Housing in the Last Year: No    Number of Places Lived in the Last Year: 1    Unstable Housing in the Last Year: No        Immunization History   Administered Date(s) Administered    Influenza - Quadrivalent - PF *Preferred* (6 months and older) 10/05/2022    Influenza - Trivalent - PF (ADULT) 11/30/2020, 11/30/2020        Medications/Allergies:       Current Outpatient Medications:     ascorbic acid, vitamin C, (VITAMIN C) 100 MG tablet, Take 100 mg by mouth once daily., Disp: , Rfl:     aspirin (ECOTRIN) 81 MG EC tablet, Take 81 mg by mouth Daily., Disp: , Rfl:     blood sugar diagnostic Strp, To check BG 1 times daily, to use with insurance preferred meter, Disp: 100 strip, Rfl: 5    blood-glucose meter kit, To check BG 1 times daily, to use with insurance preferred meter, Disp: 1 each, Rfl: 0    fluticasone propionate (FLONASE) 50 mcg/actuation nasal spray, 1 spray by Each Nostril route once daily. PRN, Disp: , Rfl:     ginkgo biloba 120 mg Tab, Take 1 tablet by mouth Daily., Disp: , Rfl:     lancets Misc, To check BG 1 times daily, to use with insurance preferred meter, Disp: 100 each, Rfl: 5    lisinopriL (PRINIVIL,ZESTRIL) 5 MG tablet, Take 1 tablet (5 mg total) by mouth once daily., Disp: 90 tablet, Rfl: 3    mecobalamin (B12 ACTIVE ORAL),  Take 1 tablet by mouth Daily., Disp: , Rfl:     metFORMIN (GLUCOPHAGE) 1000 MG tablet, Take 1 tablet (1,000 mg total) by mouth 2 (two) times daily with meals., Disp: 180 tablet, Rfl: 3    multivitamin (THERAGRAN) per tablet, Take 1 tablet by mouth once daily., Disp: , Rfl:     semaglutide (OZEMPIC) 0.25 mg or 0.5 mg (2 mg/3 mL) pen injector, Inject 0.5 mg into the skin every 7 days., Disp: 12 each, Rfl: 3    tirzepatide (MOUNJARO) 5 mg/0.5 mL PnIj, Inject 5 mg into the skin every 7 days., Disp: 4 pen, Rfl: 5    tumeric-ging-olive-oreg-capryl 100 mg-150 mg- 50 mg-150 mg Cap, Take 1 tablet by mouth once daily., Disp: , Rfl:     vitamin D (VITAMIN D3) 1000 units Tab, Take 1,000 Units by mouth Daily., Disp: , Rfl:     zinc gluconate 50 mg tablet, Take 50 mg by mouth once daily., Disp: , Rfl:     Review of patient's allergies indicates:  No Known Allergies     Review of Systems:      Constitutional: denies fevers, chills, weight loss  HEENT: denies blurry/double vision, changes in hearing, odynophagia, dysphagia  Respiratory: denies cough, shortness of breath  Cardiovascular: denies palpitations, swelling of the extremities  GI: denies abdominal pain, nausea/vomiting, hematochezia, + frequent stools  : denies frequency, dysuria, flank pain, hematuria  Skin: denies new rashes  Neurological: denies muscular/sensory deficiencies, loss of coordination, headaches, memory changes  Endo: denies hair loss/thinning, nervousness, hot flashes, heat/cold intolerance, lumps in the neck area  Heme: denies easy bruising and fatigue  Psychological: denies anxious/depressive moods  Musculoskeletal: denies bony pain, muscle cramps, swollen joints       Objective/Physical Exam     Vitals:    07/24/23 1140   BP: 119/81   Pulse: 77   Resp: 18   Temp: 97.7 °F (36.5 °C)          General: The patient is awake, alert and oriented times three. The patient is well nourished and in no acute distress.  Neck: There is no evidence of palpable  cervical, supraclavicular or axillary adenopathy. The neck is supple. The thyroid is not enlarged.  Musculoskeletal: The patient has a normal range of motion of her bilateral upper extremities.  Chest: Examination of the chest wall fails to reveal any obvious abnormalities. Nonlabored breathing, symmetric expansion.  Breast Exam: The patient's breasts are symmetric.    Right: On inspection, there is no skin dimpling, rashes, retraction, erythema or skin abnormalities. The nipple areolar complex is normal. The breast moves normally with movement of the pectoralis muscle.  On palpation there are no dominant masses. There is no warmth, tenderness or fluctuance in the breast. There is no nipple discharge.  Left: On inspection, there is no skin dimpling, rashes, retraction, erythema or skin abnormalities. The nipple areolar complex is normal. The breast moves normally with movement of the pectoralis muscle.  On palpation there are no dominant masses. There is no warmth, tenderness or fluctuance in the breast. There is no nipple discharge.    Assessment and Plan     Patient Active Problem List   Diagnosis    Type 2 diabetes mellitus with diabetic peripheral angiopathy without gangrene, without long-term current use of insulin    Hypertension    Nontoxic single thyroid nodule    Mixed hyperlipidemia       Ailyn was seen today for follow-up.    Diagnoses and all orders for this visit:    At high risk for breast cancer  -     Ambulatory referral/consult to Genetics; Future    Family history of breast cancer  -     Ambulatory referral/consult to Genetics; Future    Screening mammogram for high-risk patient  -     Mammo Digital Screening with Contrast, Bilateral; Future       --------------------------------------------------------------------------------------------------------------  Ailyn Ramos is a 52 y.o.female who is here today for high risk follow up.  There are no concerning findings on her breast exam or on  recent imaging.  Today Ailyn Ramos was plugged into the [employer] risk calculator and her calculated risk of breast cancer based on Tyrer - Cuzick Breast Cancer Risk Model was 34%.  She understands that >20% is considered high risk.  Today we again discussed risk factors associated with the development of breast cancer and risk reduction strategies.       PLAN:     1. Lifestyle - Healthy lifestyle guidelines were reviewed. She was encouraged to engage in regular exercise, maintain a healthy body weight, and avoid excessive alcohol consumption. Healthy nutritional guidelines were also discussed.      2. Imaging: She wants to continue with high risk screening but she is not able to tolerate MRI. We discussed contrast enhanced mammography and she is interested. Will schedule this for Nov 2023.     3. Recommend 2 clinical breast exams/year. She is working on finding a new GYN. Will schedule RTC in 6M.    4. She is interested in genetic testing. She is adopted but knows that her mother and sister both had breast cancer and her mother also had stomach cancer. Will refer her to Tessa Barnett to discuss testing.      The patient was encouraged to continue her self breast exam. If she notes any changes or has any concerns with her breast in the interim she was encouraged to call the breast center to schedule an appointment as soon as possible. All of her questions were answered.     Ioana Painter MD  Breast Surgical Oncologist     I spent a total of 30 minutes on the day of the visit.  This includes face to face time and non-face to face time preparing to see the patient (eg, review of tests), obtaining and/or reviewing separately obtained history, documenting clinical information in the electronic or other health record, independently interpreting results and communicating results to the patient/family/caregiver, or care coordinator.

## 2023-08-05 DIAGNOSIS — I10 PRIMARY HYPERTENSION: Primary | ICD-10-CM

## 2023-08-07 RX ORDER — LISINOPRIL 5 MG/1
5 TABLET ORAL
Qty: 90 TABLET | Refills: 3 | Status: SHIPPED | OUTPATIENT
Start: 2023-08-07

## 2023-09-10 DIAGNOSIS — E66.9 DIABETES MELLITUS TYPE 2 IN OBESE: Primary | ICD-10-CM

## 2023-09-10 DIAGNOSIS — E11.69 DIABETES MELLITUS TYPE 2 IN OBESE: Primary | ICD-10-CM

## 2023-09-11 RX ORDER — METFORMIN HYDROCHLORIDE 1000 MG/1
TABLET ORAL
Qty: 174 TABLET | Refills: 3 | Status: SHIPPED | OUTPATIENT
Start: 2023-09-11

## 2023-09-12 DIAGNOSIS — E11.51 TYPE 2 DIABETES MELLITUS WITH DIABETIC PERIPHERAL ANGIOPATHY WITHOUT GANGRENE, WITHOUT LONG-TERM CURRENT USE OF INSULIN: Primary | ICD-10-CM

## 2023-10-12 LAB
LEFT EYE DM RETINOPATHY: NEGATIVE
RIGHT EYE DM RETINOPATHY: NEGATIVE

## 2023-10-17 ENCOUNTER — OFFICE VISIT (OUTPATIENT)
Dept: HEMATOLOGY/ONCOLOGY | Facility: CLINIC | Age: 53
End: 2023-10-17
Payer: COMMERCIAL

## 2023-10-17 DIAGNOSIS — Z80.3 FAMILY HISTORY OF BREAST CANCER: ICD-10-CM

## 2023-10-17 DIAGNOSIS — Z91.89 AT HIGH RISK FOR BREAST CANCER: ICD-10-CM

## 2023-10-17 PROCEDURE — 3061F NEG MICROALBUMINURIA REV: CPT | Mod: CPTII,95,, | Performed by: NURSE PRACTITIONER

## 2023-10-17 PROCEDURE — 1159F MED LIST DOCD IN RCRD: CPT | Mod: CPTII,95,, | Performed by: NURSE PRACTITIONER

## 2023-10-17 PROCEDURE — 3051F PR MOST RECENT HEMOGLOBIN A1C LEVEL 7.0 - < 8.0%: ICD-10-PCS | Mod: CPTII,95,, | Performed by: NURSE PRACTITIONER

## 2023-10-17 PROCEDURE — 4010F PR ACE/ARB THEARPY RXD/TAKEN: ICD-10-PCS | Mod: CPTII,95,, | Performed by: NURSE PRACTITIONER

## 2023-10-17 PROCEDURE — 3066F PR DOCUMENTATION OF TREATMENT FOR NEPHROPATHY: ICD-10-PCS | Mod: CPTII,95,, | Performed by: NURSE PRACTITIONER

## 2023-10-17 PROCEDURE — 4010F ACE/ARB THERAPY RXD/TAKEN: CPT | Mod: CPTII,95,, | Performed by: NURSE PRACTITIONER

## 2023-10-17 PROCEDURE — 1159F PR MEDICATION LIST DOCUMENTED IN MEDICAL RECORD: ICD-10-PCS | Mod: CPTII,95,, | Performed by: NURSE PRACTITIONER

## 2023-10-17 PROCEDURE — 3061F PR NEG MICROALBUMINURIA RESULT DOCUMENTED/REVIEW: ICD-10-PCS | Mod: CPTII,95,, | Performed by: NURSE PRACTITIONER

## 2023-10-17 PROCEDURE — 99203 OFFICE O/P NEW LOW 30 MIN: CPT | Mod: 95,,, | Performed by: NURSE PRACTITIONER

## 2023-10-17 PROCEDURE — 3066F NEPHROPATHY DOC TX: CPT | Mod: CPTII,95,, | Performed by: NURSE PRACTITIONER

## 2023-10-17 PROCEDURE — 3051F HG A1C>EQUAL 7.0%<8.0%: CPT | Mod: CPTII,95,, | Performed by: NURSE PRACTITIONER

## 2023-10-17 PROCEDURE — 99203 PR OFFICE/OUTPT VISIT, NEW, LEVL III, 30-44 MIN: ICD-10-PCS | Mod: 95,,, | Performed by: NURSE PRACTITIONER

## 2023-10-17 NOTE — PROGRESS NOTES
REFERRING PROVIDER: Dr. Ioana Painter    Subjective:       Patient ID: Ailyn Ramos is a 52 y.o. female.    Chief Complaint: No personal history of cancer and a family history of cancer. Patient presented via Telemedicine Visit (audio and video) today for risk assessment, genetic counseling, and consideration for genetic testing.     HPI  Past Medical History:   Diagnosis Date    Conductive hearing loss     Hypertension     Knee pain     Nontoxic single thyroid nodule     Perforated tympanic membrane     Type 2 diabetes mellitus with diabetic peripheral angiopathy without gangrene, without long-term current use of insulin       Past Surgical History:   Procedure Laterality Date     diagnostic arthroscopic procedures of knee joint      Repair, primary, disrupted ligament, ankle; both collateral ligaments      TYMPANOPLASTY        Review of patient's allergies indicates:  No Known Allergies   Review of Systems          Problem List Items Addressed This Visit    None  Visit Diagnoses       At high risk for breast cancer        Family history of breast cancer              Oncology History    No history exists.          Family History   Problem Relation Age of Onset    Stomach cancer Mother 60    Breast cancer Mother 72    Diabetes Father     Breast cancer Sister 53        pat 1/2 sister    Brain cancer Maternal Aunt     Learning disabilities Maternal Aunt     Throat cancer Maternal Uncle       Assessment:   Risk Assessment:  According to the National Comprehensive Cancer Network's (NCCN) personal and family history criteria she is not appropriate for genetic testing. NCCN recommends genetic testing for individuals meeting the following criteria:  1. Known mutation within the family  2.. Personal history of breast cancer:      a Diagnosed at age 45y or younger;      b. Diagnosed at age 45-50y with     1) unknown or limited family history or    2) a second breast cancer diagnosed at any age    3) >1  close blood relative with breast, ovarian, pancreatic, or metastatic or high grade (Maria Elena >7) or intraductal prostate cancer at any age   c. Diagnosed at age 60 or younger with a triple negative breast cancer    d. Diagnosed at any age:       1) with Ashkenazi Zoroastrian ancestry       2) with at least one close blood relative* with breast cancer < 50 or ovarian, pancreatic, or metastatic or intraductal prostate cancer at any age;    3) and there are >3 diagnoses of breast cancer in patient and/or close blood relatives    4) with male breast cancer  3. Personal history of ovarian (including fallopian tube cancer or peritoneal cancer) carcinoma at any age  4. Personal history of high-grade (Maria Elena >7)  prostate cancer:    1) with Ashkenazi Zoroastrian ancestry       2) with at least one close blood relative* with breast cancer < 50 or ovarian, pancreatic, or metastatic or intraductal prostate cancer at any age;    3) >2 close relatives with breast or prostate cancer (any grade) at any age  5. A mutation identified on tumor genomic testing that has clinical implications if identified in germline  6. To aid in systemic therapy decision-making  7. Family history only:    a. First or second degree relative that meets the above criteria.    b. Individual that does not meet criteria but has a probability of >5% of a BRCA1/2 mutation based on probability models (Tyrer-Cuzick, BRCAPro, Pennil)  8. Consider testing for:    1) bilateral breast cancer with 1st diagnosed between 50y and 65y    2) Unaffected Ashkenazi Zoroastrian individual    3) individual that does not meet other criteria by with a  >2/5%-5% of a BRCA1/2 mutation based on probability models (Tyrer-Cuzick, BRCAPro, Pennil)  9. Personal or family history of three or more of the following:    Breast, pancreatic cancer, prostate cancer (Maria Elena score >7 or metastatic), melanoma, sarcoma, adrenocortical carcinoma, brain tumors, leukemia, diffuse            gastric cancer,  colon cancer, endometrial cancer, thyroid cancer, kidney cancer, dermatologic manifestations and/or macrocephaly, hamartomatous polyps of GI       tract.    *NCCN defines blood relative as first- (parents, siblings and children), second- (grandparents, aunts, uncles, nieces and nephews, grandchildren and half-siblings), and third degree-relatives (great-grandparents, great-aunts, great uncles, great grandchildren and first cousins) on same side of family.    SUMMARY:  This patient does not meet NCCN criteria for genetic testing. I have asked that she contact me if she discovers additional cancer family history and appropriateness of testing will be reevaluated.    The patient location is: home    Visit type: audiovisual    Face to Face time with patient: 20 minutes  >30 minutes of total time spent on the encounter, which includes face to face time and non-face to face time preparing to see the patient (eg, review of tests), Obtaining and/or reviewing separately obtained history, Documenting clinical information in the electronic or other health record, Independently interpreting results (not separately reported) and communicating results to the patient/family/caregiver, or Care coordination (not separately reported).         Each patient to whom he or she provides medical services by telemedicine is:  (1) informed of the relationship between the physician and patient and the respective role of any other health care provider with respect to management of the patient; and (2) notified that he or she may decline to receive medical services by telemedicine and may withdraw from such care at any time.        JOSE PATTON, PhD

## 2023-11-06 ENCOUNTER — PATIENT OUTREACH (OUTPATIENT)
Dept: ADMINISTRATIVE | Facility: HOSPITAL | Age: 53
End: 2023-11-06
Payer: COMMERCIAL

## 2023-11-06 NOTE — PROGRESS NOTES
The following record(s)  below were uploaded for Health Maintenance .    DM EYE EXAM 10/12/23     Population Health Outreach.

## 2023-11-29 ENCOUNTER — HOSPITAL ENCOUNTER (OUTPATIENT)
Dept: RADIOLOGY | Facility: HOSPITAL | Age: 53
Discharge: HOME | End: 2023-11-29
Attending: STUDENT IN AN ORGANIZED HEALTH CARE EDUCATION/TRAINING PROGRAM
Payer: COMMERCIAL

## 2023-11-29 DIAGNOSIS — Z12.31 SCREENING MAMMOGRAM FOR HIGH-RISK PATIENT: ICD-10-CM

## 2023-11-29 LAB
CREAT SERPL-MCNC: 0.66 MG/DL (ref 0.55–1.02)
GFR SERPLBLD CREATININE-BSD FMLA CKD-EPI: >60 MLS/MIN/1.73/M2

## 2023-11-29 PROCEDURE — 82565 ASSAY OF CREATININE: CPT | Performed by: STUDENT IN AN ORGANIZED HEALTH CARE EDUCATION/TRAINING PROGRAM

## 2023-11-29 PROCEDURE — 77067 MAMMO DIGITAL SCREENING WITH CONTRAST, BILATERAL: ICD-10-PCS | Mod: 26,,, | Performed by: RADIOLOGY

## 2023-11-29 PROCEDURE — 25500020 PHARM REV CODE 255

## 2023-11-29 PROCEDURE — 77067 SCR MAMMO BI INCL CAD: CPT | Mod: TC

## 2023-11-29 PROCEDURE — 77067 SCR MAMMO BI INCL CAD: CPT | Mod: 26,,, | Performed by: RADIOLOGY

## 2023-11-29 RX ADMIN — IOHEXOL 100 ML: 350 INJECTION, SOLUTION INTRAVENOUS at 01:11

## 2023-11-29 RX ADMIN — IOHEXOL 50 ML: 350 INJECTION, SOLUTION INTRAVENOUS at 01:11

## 2024-02-14 ENCOUNTER — TELEPHONE (OUTPATIENT)
Dept: INTERNAL MEDICINE | Facility: CLINIC | Age: 54
End: 2024-02-14
Payer: COMMERCIAL

## 2024-02-14 DIAGNOSIS — E11.51 TYPE 2 DIABETES MELLITUS WITH DIABETIC PERIPHERAL ANGIOPATHY WITHOUT GANGRENE, WITHOUT LONG-TERM CURRENT USE OF INSULIN: Primary | ICD-10-CM

## 2024-02-14 DIAGNOSIS — R39.9 UTI SYMPTOMS: ICD-10-CM

## 2024-02-14 NOTE — TELEPHONE ENCOUNTER
Pt will go get UA, and Micro done on Monday. 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 Pt has been advised to utilize AZO if needed. Pt does have an upcoming appt soon.

## 2024-02-14 NOTE — TELEPHONE ENCOUNTER
----- Message from Anali Mendoza sent at 2/14/2024 10:06 AM CST -----  Regarding: pt wants to do ua  Patient says she is having frequent urination and wants to know if you can order ua on her. Call her and let her know. thanks

## 2024-02-19 ENCOUNTER — LAB VISIT (OUTPATIENT)
Dept: LAB | Facility: HOSPITAL | Age: 54
End: 2024-02-19
Attending: INTERNAL MEDICINE
Payer: COMMERCIAL

## 2024-02-19 DIAGNOSIS — E11.51 TYPE 2 DIABETES MELLITUS WITH DIABETIC PERIPHERAL ANGIOPATHY WITHOUT GANGRENE, WITHOUT LONG-TERM CURRENT USE OF INSULIN: ICD-10-CM

## 2024-02-19 DIAGNOSIS — R39.9 UTI SYMPTOMS: ICD-10-CM

## 2024-02-19 LAB
APPEARANCE UR: CLEAR
BACTERIA #/AREA URNS AUTO: ABNORMAL /HPF
BILIRUB UR QL STRIP.AUTO: NEGATIVE
COLOR UR AUTO: ABNORMAL
CREAT UR-MCNC: 70.2 MG/DL (ref 45–106)
EST. AVERAGE GLUCOSE BLD GHB EST-MCNC: 154.2 MG/DL
GLUCOSE UR QL STRIP.AUTO: ABNORMAL
HBA1C MFR BLD: 7 %
HYALINE CASTS #/AREA URNS LPF: ABNORMAL /LPF
KETONES UR QL STRIP.AUTO: NEGATIVE
LEUKOCYTE ESTERASE UR QL STRIP.AUTO: NEGATIVE
MICROALBUMIN UR-MCNC: 8 UG/ML
MICROALBUMIN/CREAT RATIO PNL UR: 11.4 MG/GM CR (ref 0–30)
MUCOUS THREADS URNS QL MICRO: ABNORMAL /LPF
NITRITE UR QL STRIP.AUTO: NEGATIVE
PH UR STRIP.AUTO: 5.5 [PH]
PROT UR QL STRIP.AUTO: NEGATIVE
RBC #/AREA URNS AUTO: ABNORMAL /HPF
RBC UR QL AUTO: NEGATIVE
SP GR UR STRIP.AUTO: 1.02 (ref 1–1.03)
SQUAMOUS #/AREA URNS LPF: ABNORMAL /HPF
UROBILINOGEN UR STRIP-ACNC: NORMAL
WBC #/AREA URNS AUTO: ABNORMAL /HPF

## 2024-02-19 PROCEDURE — 82043 UR ALBUMIN QUANTITATIVE: CPT

## 2024-02-19 PROCEDURE — 83036 HEMOGLOBIN GLYCOSYLATED A1C: CPT

## 2024-02-19 PROCEDURE — 81001 URINALYSIS AUTO W/SCOPE: CPT

## 2024-02-19 PROCEDURE — 36415 COLL VENOUS BLD VENIPUNCTURE: CPT

## 2024-02-21 ENCOUNTER — OFFICE VISIT (OUTPATIENT)
Dept: INTERNAL MEDICINE | Facility: CLINIC | Age: 54
End: 2024-02-21
Payer: COMMERCIAL

## 2024-02-21 VITALS
TEMPERATURE: 99 F | HEART RATE: 82 BPM | WEIGHT: 262.63 LBS | BODY MASS INDEX: 43.7 KG/M2 | OXYGEN SATURATION: 98 % | SYSTOLIC BLOOD PRESSURE: 128 MMHG | DIASTOLIC BLOOD PRESSURE: 80 MMHG | RESPIRATION RATE: 18 BRPM

## 2024-02-21 DIAGNOSIS — E78.2 MIXED HYPERLIPIDEMIA: Chronic | ICD-10-CM

## 2024-02-21 DIAGNOSIS — R19.8 GI SYMPTOMS: ICD-10-CM

## 2024-02-21 DIAGNOSIS — E11.65 TYPE 2 DIABETES MELLITUS WITH HYPERGLYCEMIA, WITHOUT LONG-TERM CURRENT USE OF INSULIN: Chronic | ICD-10-CM

## 2024-02-21 DIAGNOSIS — E66.01 CLASS 3 SEVERE OBESITY DUE TO EXCESS CALORIES WITH SERIOUS COMORBIDITY AND BODY MASS INDEX (BMI) OF 40.0 TO 44.9 IN ADULT: ICD-10-CM

## 2024-02-21 DIAGNOSIS — E04.2 MULTIPLE THYROID NODULES: Primary | Chronic | ICD-10-CM

## 2024-02-21 DIAGNOSIS — I10 PRIMARY HYPERTENSION: Chronic | ICD-10-CM

## 2024-02-21 DIAGNOSIS — Z00.00 WELLNESS EXAMINATION: ICD-10-CM

## 2024-02-21 PROBLEM — E66.813 CLASS 3 SEVERE OBESITY DUE TO EXCESS CALORIES WITH SERIOUS COMORBIDITY AND BODY MASS INDEX (BMI) OF 40.0 TO 44.9 IN ADULT: Chronic | Status: ACTIVE | Noted: 2024-02-21

## 2024-02-21 PROBLEM — E66.813 CLASS 3 SEVERE OBESITY DUE TO EXCESS CALORIES WITH SERIOUS COMORBIDITY AND BODY MASS INDEX (BMI) OF 40.0 TO 44.9 IN ADULT: Status: ACTIVE | Noted: 2024-02-21

## 2024-02-21 PROBLEM — E04.1 THYROID NODULE: Status: ACTIVE | Noted: 2024-02-21

## 2024-02-21 PROCEDURE — 3074F SYST BP LT 130 MM HG: CPT | Mod: CPTII,,,

## 2024-02-21 PROCEDURE — 1159F MED LIST DOCD IN RCRD: CPT | Mod: CPTII,,,

## 2024-02-21 PROCEDURE — 3079F DIAST BP 80-89 MM HG: CPT | Mod: CPTII,,,

## 2024-02-21 PROCEDURE — 3008F BODY MASS INDEX DOCD: CPT | Mod: CPTII,,,

## 2024-02-21 PROCEDURE — 1160F RVW MEDS BY RX/DR IN RCRD: CPT | Mod: CPTII,,,

## 2024-02-21 PROCEDURE — 4010F ACE/ARB THERAPY RXD/TAKEN: CPT | Mod: CPTII,,,

## 2024-02-21 PROCEDURE — 3061F NEG MICROALBUMINURIA REV: CPT | Mod: CPTII,,,

## 2024-02-21 PROCEDURE — 3051F HG A1C>EQUAL 7.0%<8.0%: CPT | Mod: CPTII,,,

## 2024-02-21 PROCEDURE — 3066F NEPHROPATHY DOC TX: CPT | Mod: CPTII,,,

## 2024-02-21 PROCEDURE — 99214 OFFICE O/P EST MOD 30 MIN: CPT | Mod: ,,,

## 2024-02-21 RX ORDER — TIRZEPATIDE 2.5 MG/.5ML
2.5 INJECTION, SOLUTION SUBCUTANEOUS
Qty: 4 PEN | Refills: 2 | Status: SHIPPED | OUTPATIENT
Start: 2024-02-21 | End: 2024-05-15

## 2024-02-21 NOTE — PROGRESS NOTES
"    Patient ID: Ailyn Ramos is a 53 y.o. female.    Chief Complaint: Follow-up (Pt has concerns with ozempic side effects and dosage, also has concerns with dent behind her head pt states it has been there for years does not cause any pain or discomfort, also has concerns with nodule on thyroid )     53-year-old female here today for a routine follow-up visit.  Medical comorbidities include HTN, dm 2, thyroid nodule, obesity.  Also followed by high-risk breast clinic for significant family history i breast cancer in mother (at 73 y.o.) and sister (at 54 y.o.). CT calcium score from March 2023 noted to be at 0 .  Since last visit patient reports has been fairly well without acute injury or major illness.  Most recent hemoglobin A1c 7.0, improved from prior 7.9.  Reports compliance currently on Ozempic 5 mg weekly, however does report some issues with intermittent loose bowels and constipation.  Per patient, has struggled with this for some time however more prominent with being on Ozempic.  Denies nausea, vomiting, abdominal pain.  BMI 43.7, minimal weight loss currently on Ozempic.  Discussed giving trial on Mounjaro hopefully will be able to tolerate with less GI issues.  Patient also with history of thyroid nodules, for which we will obtain an updated thyroid ultrasound and panel.  Otherwise generally stable for today's visit.        Well: 10/5/22; due scheduled next visit          MEDICAL HISTORY:    Past Medical History:   Diagnosis Date    Conductive hearing loss     Knee pain     Mixed hyperlipidemia 03/10/2023    Multiple thyroid nodules     "Normal size thyroid gland with small nodular scattered throughout both lobes and no new nodule identified.  The largest thyroid nodule is a solid hypoechoic nodule at the posterior right mid thyroid lobe measuring 1.2 cm in greatest dimension which is only minimally enlarged dating back to a thyroid ultrasound 03/22/2017."  Date:   03/20/2023    Perforated " tympanic membrane     Primary hypertension     Type 2 diabetes mellitus with hyperglycemia, without long-term current use of insulin 10/05/2022      Past Surgical History:   Procedure Laterality Date     diagnostic arthroscopic procedures of knee joint      Repair, primary, disrupted ligament, ankle; both collateral ligaments      TYMPANOPLASTY        Social History     Tobacco Use    Smoking status: Never    Smokeless tobacco: Never   Substance Use Topics    Alcohol use: Yes     Alcohol/week: 6.0 standard drinks of alcohol     Types: 6 Cans of beer per week     Comment: Social drinker sometimes I go months and dont drink at all    Drug use: Not Currently          Health Maintenance Due   Topic Date Due    COVID-19 Vaccine (1) Never done    Pneumococcal Vaccines (Age 0-64) (1 of 2 - PCV) Never done    TETANUS VACCINE  Never done    Low Dose Statin  Never done    Shingles Vaccine (1 of 2) Never done    Influenza Vaccine (1) 09/01/2023    Lipid Panel  10/03/2023    Foot Exam  02/06/2024          Patient Care Team:  Chela Sawyer MD as PCP - General (Internal Medicine)  Jesenia Montgomery MD as Consulting Physician (Obstetrics and Gynecology)  Edith Mauricio RN as Diabetes Educator (Diabetes)  Ping Rothman MD as Consulting Physician (Ophthalmology)      Review of Systems   Constitutional:  Negative for fatigue and fever.   HENT:  Negative for congestion, rhinorrhea, sore throat and trouble swallowing.    Eyes:  Negative for redness and visual disturbance.   Respiratory:  Negative for cough, chest tightness and shortness of breath.    Cardiovascular:  Negative for chest pain and palpitations.   Gastrointestinal:  Negative for abdominal pain, constipation, diarrhea, nausea and vomiting.   Genitourinary:  Negative for dysuria, flank pain, frequency and urgency.   Musculoskeletal:  Negative for arthralgias, gait problem and myalgias.   Skin:  Negative for rash and wound.   Neurological:  Negative for facial  asymmetry, speech difficulty, weakness and headaches.   All other systems reviewed and are negative.      Objective:   /80 (BP Location: Left arm, Patient Position: Sitting, BP Method: Large (Automatic))   Pulse 82   Temp 99.3 °F (37.4 °C) (Temporal)   Resp 18   Wt 119.1 kg (262 lb 9.6 oz)   SpO2 98%   BMI 43.70 kg/m²      Physical Exam  Constitutional:       General: She is not in acute distress.     Appearance: Normal appearance. She is obese.   HENT:      Right Ear: Tympanic membrane, ear canal and external ear normal.      Left Ear: Tympanic membrane, ear canal and external ear normal.      Nose: Nose normal.      Mouth/Throat:      Mouth: Mucous membranes are moist.      Pharynx: Oropharynx is clear.   Eyes:      Extraocular Movements: Extraocular movements intact.      Conjunctiva/sclera: Conjunctivae normal.      Pupils: Pupils are equal, round, and reactive to light.   Cardiovascular:      Rate and Rhythm: Normal rate and regular rhythm.      Pulses: Normal pulses.      Heart sounds: Normal heart sounds. No murmur heard.     No gallop.   Pulmonary:      Effort: Pulmonary effort is normal.      Breath sounds: Normal breath sounds. No wheezing.   Abdominal:      General: Bowel sounds are normal. There is no distension.      Palpations: Abdomen is soft. There is no mass.      Tenderness: There is no abdominal tenderness. There is no guarding.   Musculoskeletal:         General: Normal range of motion.   Skin:     General: Skin is warm and dry.   Neurological:      Mental Status: She is alert. Mental status is at baseline.      Sensory: No sensory deficit.      Motor: No weakness.           Assessment:       ICD-10-CM ICD-9-CM   1. Multiple thyroid nodules  E04.2 241.1   2. Class 3 severe obesity due to excess calories with serious comorbidity and body mass index (BMI) of 40.0 to 44.9 in adult  E66.01 278.01    Z68.41 V85.41   3. Type 2 diabetes mellitus with hyperglycemia, without long-term current  use of insulin  E11.65 250.00     790.29   4. GI symptoms  R19.8 787.99   5. Primary hypertension  I10 401.9   6. Mixed hyperlipidemia  E78.2 272.2        Plan:     Problem List Items Addressed This Visit          Cardiac/Vascular    Primary hypertension (Chronic)     -stable   -currently on lisinopril 5 mg, continue  -low-sodium diet         Relevant Orders    TSH    Lipid Panel    Comprehensive Metabolic Panel    CBC Auto Differential    Vitamin D    Microalbumin/Creatinine Ratio, Urine    Hemoglobin A1C    Mixed hyperlipidemia (Chronic)     -currently attempting some weight loss, continue  -order lipid profile for next visit         Relevant Orders    TSH    Lipid Panel    Comprehensive Metabolic Panel    CBC Auto Differential    Vitamin D    Microalbumin/Creatinine Ratio, Urine    Hemoglobin A1C       Endocrine    Type 2 diabetes mellitus with hyperglycemia, without long-term current use of insulin (Chronic)     -improved from prior   -order updated A1c level for next visit   -currently on Ozempic 0.5 mg with little weight loss, switch to Mounjaro 2.5 mg hopefully will have insurance coverage   -low carb diet         Relevant Medications    tirzepatide (MOUNJARO) 2.5 mg/0.5 mL PnIj    Other Relevant Orders    TSH    Lipid Panel    Comprehensive Metabolic Panel    CBC Auto Differential    Vitamin D    Microalbumin/Creatinine Ratio, Urine    Hemoglobin A1C    Multiple thyroid nodules - Primary (Chronic)     -order up-to-date thyroid ultrasound for next visit  -order TSH for next visit         Relevant Orders    TSH    Lipid Panel    Comprehensive Metabolic Panel    CBC Auto Differential    Vitamin D    Microalbumin/Creatinine Ratio, Urine    Hemoglobin A1C    US Soft Tissue Head Neck    Class 3 severe obesity due to excess calories with serious comorbidity and body mass index (BMI) of 40.0 to 44.9 in adult (Chronic)     -BMI 43.7   -encourage low-calorie low carb diet  -encourage some form of routine aerobic  exercise for weight loss   -currently on Ozempic, switch to Mounjaro for enhance weight loss         Relevant Medications    tirzepatide (MOUNJARO) 2.5 mg/0.5 mL PnIj    Other Relevant Orders    TSH    Lipid Panel    Comprehensive Metabolic Panel    CBC Auto Differential    Vitamin D    Microalbumin/Creatinine Ratio, Urine    Hemoglobin A1C       GI    GI symptoms       -spectrum between diarrhea and constipation   -consider IBS   -consider current medication Ozempic, switch to Mounjaro trial hopefully will tolerate better               Follow up in about 3 months (around 5/21/2024) for Wellness with labs prior to visit.   -plan specifics discussed above    Orders Placed This Encounter    US Soft Tissue Head Neck    TSH    Lipid Panel    Comprehensive Metabolic Panel    CBC Auto Differential    Vitamin D    Microalbumin/Creatinine Ratio, Urine    Hemoglobin A1C    tirzepatide (MOUNJARO) 2.5 mg/0.5 mL PnIj        Medication List with Changes/Refills   New Medications    TIRZEPATIDE (MOUNJARO) 2.5 MG/0.5 ML PNIJ    Inject 2.5 mg into the skin every 7 days.   Current Medications    ASCORBIC ACID, VITAMIN C, (VITAMIN C) 100 MG TABLET    Take 100 mg by mouth once daily.    ASPIRIN (ECOTRIN) 81 MG EC TABLET    Take 81 mg by mouth Daily.    BLOOD SUGAR DIAGNOSTIC STRP    To check BG 1 times daily, to use with insurance preferred meter    BLOOD-GLUCOSE METER KIT    To check BG 1 times daily, to use with insurance preferred meter    FLUTICASONE PROPIONATE (FLONASE) 50 MCG/ACTUATION NASAL SPRAY    1 spray by Each Nostril route once daily. PRN    GINKGO BILOBA 120 MG TAB    Take 1 tablet by mouth Daily.    LANCETS MISC    To check BG 1 times daily, to use with insurance preferred meter    LISINOPRIL (PRINIVIL,ZESTRIL) 5 MG TABLET    TAKE 1 TABLET(5 MG) BY MOUTH EVERY DAY    MECOBALAMIN (B12 ACTIVE ORAL)    Take 1 tablet by mouth Daily.    METFORMIN (GLUCOPHAGE) 1000 MG TABLET    TAKE ONE-HALF TABLET BY MOUTH TWICE DAILY FOR 1  WEEK, THEN TAKE 1 TABLET(1000 MG) BY MOUTH TWICE DAILY WITH MEALS    MULTIVITAMIN (THERAGRAN) PER TABLET    Take 1 tablet by mouth once daily.    VITAMIN D (VITAMIN D3) 1000 UNITS TAB    Take 1,000 Units by mouth Daily.    ZINC GLUCONATE 50 MG TABLET    Take 50 mg by mouth once daily.   Discontinued Medications    SEMAGLUTIDE (OZEMPIC) 0.25 MG OR 0.5 MG (2 MG/3 ML) PEN INJECTOR    Inject 0.5 mg into the skin every 7 days.

## 2024-02-21 NOTE — ASSESSMENT & PLAN NOTE
-improved from prior   -order updated A1c level for next visit   -currently on Ozempic 0.5 mg with little weight loss, switch to Mounjaro 2.5 mg hopefully will have insurance coverage   -low carb diet

## 2024-02-21 NOTE — ASSESSMENT & PLAN NOTE
-spectrum between diarrhea and constipation   -consider IBS   -consider current medication Ozempic, switch to Mounjaro trial hopefully will tolerate better

## 2024-02-21 NOTE — ASSESSMENT & PLAN NOTE
-BMI 43.7   -encourage low-calorie low carb diet  -encourage some form of routine aerobic exercise for weight loss   -currently on Ozempic, switch to Mounjaro for enhance weight loss

## 2024-04-03 ENCOUNTER — HOSPITAL ENCOUNTER (OUTPATIENT)
Dept: RADIOLOGY | Facility: HOSPITAL | Age: 54
Discharge: HOME | End: 2024-04-03
Payer: COMMERCIAL

## 2024-04-03 ENCOUNTER — TELEPHONE (OUTPATIENT)
Dept: INTERNAL MEDICINE | Facility: CLINIC | Age: 54
End: 2024-04-03

## 2024-04-03 ENCOUNTER — OFFICE VISIT (OUTPATIENT)
Dept: SURGERY | Facility: CLINIC | Age: 54
End: 2024-04-03
Payer: COMMERCIAL

## 2024-04-03 VITALS
RESPIRATION RATE: 18 BRPM | HEIGHT: 65 IN | TEMPERATURE: 97 F | BODY MASS INDEX: 44.85 KG/M2 | WEIGHT: 269.19 LBS | SYSTOLIC BLOOD PRESSURE: 143 MMHG | DIASTOLIC BLOOD PRESSURE: 86 MMHG | OXYGEN SATURATION: 97 % | HEART RATE: 81 BPM

## 2024-04-03 DIAGNOSIS — E04.2 MULTIPLE THYROID NODULES: Chronic | ICD-10-CM

## 2024-04-03 DIAGNOSIS — Z12.39 BREAST CANCER SCREENING, HIGH RISK PATIENT: ICD-10-CM

## 2024-04-03 DIAGNOSIS — E04.2 MULTINODULAR THYROID: Primary | ICD-10-CM

## 2024-04-03 DIAGNOSIS — Z91.89 AT HIGH RISK FOR BREAST CANCER: Primary | ICD-10-CM

## 2024-04-03 DIAGNOSIS — Z80.3 FAMILY HISTORY OF BREAST CANCER: ICD-10-CM

## 2024-04-03 PROCEDURE — 3077F SYST BP >= 140 MM HG: CPT | Mod: CPTII,S$GLB,, | Performed by: STUDENT IN AN ORGANIZED HEALTH CARE EDUCATION/TRAINING PROGRAM

## 2024-04-03 PROCEDURE — 3008F BODY MASS INDEX DOCD: CPT | Mod: CPTII,S$GLB,, | Performed by: STUDENT IN AN ORGANIZED HEALTH CARE EDUCATION/TRAINING PROGRAM

## 2024-04-03 PROCEDURE — 1160F RVW MEDS BY RX/DR IN RCRD: CPT | Mod: CPTII,S$GLB,, | Performed by: STUDENT IN AN ORGANIZED HEALTH CARE EDUCATION/TRAINING PROGRAM

## 2024-04-03 PROCEDURE — 3079F DIAST BP 80-89 MM HG: CPT | Mod: CPTII,S$GLB,, | Performed by: STUDENT IN AN ORGANIZED HEALTH CARE EDUCATION/TRAINING PROGRAM

## 2024-04-03 PROCEDURE — 4010F ACE/ARB THERAPY RXD/TAKEN: CPT | Mod: CPTII,S$GLB,, | Performed by: STUDENT IN AN ORGANIZED HEALTH CARE EDUCATION/TRAINING PROGRAM

## 2024-04-03 PROCEDURE — 1159F MED LIST DOCD IN RCRD: CPT | Mod: CPTII,S$GLB,, | Performed by: STUDENT IN AN ORGANIZED HEALTH CARE EDUCATION/TRAINING PROGRAM

## 2024-04-03 PROCEDURE — 3051F HG A1C>EQUAL 7.0%<8.0%: CPT | Mod: CPTII,S$GLB,, | Performed by: STUDENT IN AN ORGANIZED HEALTH CARE EDUCATION/TRAINING PROGRAM

## 2024-04-03 PROCEDURE — 3066F NEPHROPATHY DOC TX: CPT | Mod: CPTII,S$GLB,, | Performed by: STUDENT IN AN ORGANIZED HEALTH CARE EDUCATION/TRAINING PROGRAM

## 2024-04-03 PROCEDURE — 99213 OFFICE O/P EST LOW 20 MIN: CPT | Mod: S$GLB,,, | Performed by: STUDENT IN AN ORGANIZED HEALTH CARE EDUCATION/TRAINING PROGRAM

## 2024-04-03 PROCEDURE — 3061F NEG MICROALBUMINURIA REV: CPT | Mod: CPTII,S$GLB,, | Performed by: STUDENT IN AN ORGANIZED HEALTH CARE EDUCATION/TRAINING PROGRAM

## 2024-04-03 PROCEDURE — 76536 US EXAM OF HEAD AND NECK: CPT | Mod: TC

## 2024-04-03 PROCEDURE — 99999 PR PBB SHADOW E&M-EST. PATIENT-LVL V: CPT | Mod: PBBFAC,,, | Performed by: STUDENT IN AN ORGANIZED HEALTH CARE EDUCATION/TRAINING PROGRAM

## 2024-04-03 NOTE — TELEPHONE ENCOUNTER
Noted multinodular thyroid.  Some change in size.  This has been ongoing for several years, however will refer to endocrinology for formal evaluation.  No history of prior biopsies per patient.      ICD-10-CM ICD-9-CM   1. Multinodular thyroid  E04.2 241.1     Orders Placed This Encounter    Thyroid peroxidase antibody    TSH    T3    T4    T4, free    Ambulatory referral/consult to Endocrinology

## 2024-04-03 NOTE — PROGRESS NOTES
[facility] Breast Surg  Breast Surgical Oncology  Established Patient Office Visit - H&P      PCP: Dr. Sawyer     Chief Complaint:   Chief Complaint   Patient presents with    Follow-up     Patient reports no breast related concerns       Subjective:      Interval: Ailyn Ramos is a 53 y.o.female who presents today for high risk FU. She denies any breast changes including new masses, skin changes, skin lesions, pain, nipple discharge or retraction.  She had her Bilateral screening mammogram with contrast in 2023 which was normal.  She did see the genetic counselor but genetic testing was not recommended.      History of Present Illness:  Ailyn Ramos  is a pleasant female patient who initially presents on  2/3/2023 at 53 y.o. years old for evaluation and assessment of risk for breast cancer based on  the Tyrer - Cuzick Breast Cancer Risk Model (> 20% indicates elevated lifetime risk). Her lifetime risk is calculated to be 33.3%,.  Her 5 year Griselda score is 3.8%.   Her family history is significant for her mom with breast cancer at 73 and sister breast cancer at 54.  She was adopted but has most knowledge of her family history.  She is a . No breast surgeries or biopsies in the past.     Imagin/15/2022 SCR MG at [facility]- BIRADS 1. Density B. No significant masses, calcifications, or other findings are seen in either breast.  There has been no significant interval change.  BL screening mammogram with contrast 2023:  Density B. No suspicious enhancement either breast.  BI-RADS 1.      I have reviewed the imaging and agree with the radiologists interpretation. I have discussed these results with the patient.    Pathology: none    OB / GYN History   Menarche Onset:12  Menopause: Menopause: postmenopausal at 51  Hormonal birth control (duration): 4years  Pregnancies: 0  Age at first child birth: n/a   Child births: 0  Hysterectomy/Oophorectomy: n/a  "  HRT: no    Family History of Cancer (& age at diagnosis):  -   Family History   Problem Relation Age of Onset    Stomach cancer Mother 60    Breast cancer Mother 72    Diabetes Father     Breast cancer Sister 53        pat 1/2 sister    Brain cancer Maternal Aunt     Learning disabilities Maternal Aunt     Throat cancer Maternal Uncle         Lifestyle:  Height and Weight: 5'5, 273  BMI: Body mass index is 44.8 kg/m².     Other:  # of breast biopsies (when and pathology results): 0  MG breast density BIRADS B  Prior thoracic RT: none  Genetic testing: no  Ashkenazi Caodaism descent: No    Other History:     Past Medical History:   Diagnosis Date    Conductive hearing loss     Knee pain     Mixed hyperlipidemia 03/10/2023    Multiple thyroid nodules     "Normal size thyroid gland with small nodular scattered throughout both lobes and no new nodule identified.  The largest thyroid nodule is a solid hypoechoic nodule at the posterior right mid thyroid lobe measuring 1.2 cm in greatest dimension which is only minimally enlarged dating back to a thyroid ultrasound 03/22/2017."  Date:   03/20/2023    Perforated tympanic membrane     Primary hypertension     Type 2 diabetes mellitus with hyperglycemia, without long-term current use of insulin 10/05/2022        Past Surgical History:   Procedure Laterality Date     diagnostic arthroscopic procedures of knee joint      Repair, primary, disrupted ligament, ankle; both collateral ligaments      TYMPANOPLASTY          Social History     Socioeconomic History    Marital status: Single   Tobacco Use    Smoking status: Never    Smokeless tobacco: Never   Substance and Sexual Activity    Alcohol use: Yes     Alcohol/week: 6.0 standard drinks of alcohol     Types: 6 Cans of beer per week     Comment: Social drinker sometimes I go months and dont drink at all    Drug use: Not Currently    Sexual activity: Not Currently     Birth control/protection: Abstinence     Social Determinants " of Health     Financial Resource Strain: Low Risk  (6/20/2023)    Overall Financial Resource Strain (CARDIA)     Difficulty of Paying Living Expenses: Not hard at all   Food Insecurity: No Food Insecurity (6/20/2023)    Hunger Vital Sign     Worried About Running Out of Food in the Last Year: Never true     Ran Out of Food in the Last Year: Never true   Transportation Needs: No Transportation Needs (6/20/2023)    PRAPARE - Transportation     Lack of Transportation (Medical): No     Lack of Transportation (Non-Medical): No   Physical Activity: Insufficiently Active (6/20/2023)    Exercise Vital Sign     Days of Exercise per Week: 4 days     Minutes of Exercise per Session: 30 min   Stress: No Stress Concern Present (6/20/2023)    [employer] [place] [employer] - Occupational Stress Questionnaire     Feeling of Stress : Not at all   Social Connections: Unknown (6/20/2023)    Social Connection and Isolation Panel [NHANES]     Frequency of Communication with Friends and Family: More than three times a week     Frequency of Social Gatherings with Friends and Family: More than three times a week     Attends Latter day Services: Patient declined     Active Member of Clubs or Organizations: No     Attends Club or Organization Meetings: Never     Marital Status: Never    Housing Stability: Low Risk  (6/20/2023)    Housing Stability Vital Sign     Unable to Pay for Housing in the Last Year: No     Number of Places Lived in the Last Year: 1     Unstable Housing in the Last Year: No        Immunization History   Administered Date(s) Administered    Influenza - Quadrivalent - PF *Preferred* (6 months and older) 11/30/2020, 10/05/2022    Influenza - Trivalent - PF (ADULT) 11/30/2020, 11/30/2020        Medications/Allergies:       Current Outpatient Medications:     ascorbic acid, vitamin C, (VITAMIN C) 100 MG tablet, Take 100 mg by mouth once daily., Disp: , Rfl:     aspirin (ECOTRIN) 81 MG EC tablet, Take 81 mg  by mouth Daily., Disp: , Rfl:     blood sugar diagnostic Strp, To check BG 1 times daily, to use with insurance preferred meter, Disp: 100 strip, Rfl: 5    blood-glucose meter kit, To check BG 1 times daily, to use with insurance preferred meter, Disp: 1 each, Rfl: 0    fluticasone propionate (FLONASE) 50 mcg/actuation nasal spray, 1 spray by Each Nostril route once daily. PRN, Disp: , Rfl:     ginkgo biloba 120 mg Tab, Take 1 tablet by mouth Daily., Disp: , Rfl:     lancets Misc, To check BG 1 times daily, to use with insurance preferred meter, Disp: 100 each, Rfl: 5    lisinopriL (PRINIVIL,ZESTRIL) 5 MG tablet, TAKE 1 TABLET(5 MG) BY MOUTH EVERY DAY, Disp: 90 tablet, Rfl: 3    mecobalamin (B12 ACTIVE ORAL), Take 1 tablet by mouth Daily., Disp: , Rfl:     metFORMIN (GLUCOPHAGE) 1000 MG tablet, TAKE ONE-HALF TABLET BY MOUTH TWICE DAILY FOR 1 WEEK, THEN TAKE 1 TABLET(1000 MG) BY MOUTH TWICE DAILY WITH MEALS, Disp: 174 tablet, Rfl: 3    multivitamin (THERAGRAN) per tablet, Take 1 tablet by mouth once daily., Disp: , Rfl:     tirzepatide (MOUNJARO) 2.5 mg/0.5 mL PnIj, Inject 2.5 mg into the skin every 7 days., Disp: 4 pen , Rfl: 2    vitamin D (VITAMIN D3) 1000 units Tab, Take 1,000 Units by mouth Daily., Disp: , Rfl:     zinc gluconate 50 mg tablet, Take 50 mg by mouth once daily., Disp: , Rfl:     Review of patient's allergies indicates:  No Known Allergies     Review of Systems:    See HPI for pertinent ROS.     Objective/Physical Exam     Vitals:    04/03/24 1104   BP: (!) 143/86   Pulse: 81   Resp: 18   Temp: 97.4 °F (36.3 °C)            General: The patient is awake, alert and oriented times three. The patient is well nourished and in no acute distress.  Neck: There is no evidence of palpable cervical, supraclavicular or axillary adenopathy. The neck is supple. The thyroid is not enlarged.  Musculoskeletal: The patient has a normal range of motion of her bilateral upper extremities.  Chest: Examination of the  chest wall fails to reveal any obvious abnormalities. Nonlabored breathing, symmetric expansion.  Breast Exam: The patient's breasts are symmetric.    Right: On inspection, there is no skin dimpling, rashes, retraction, erythema or skin abnormalities. The nipple areolar complex is normal. The breast moves normally with movement of the pectoralis muscle.  On palpation there are no dominant masses. There is no warmth, tenderness or fluctuance in the breast. There is no nipple discharge.  Left: On inspection, there is no skin dimpling, rashes, retraction, erythema or skin abnormalities. The nipple areolar complex is normal. The breast moves normally with movement of the pectoralis muscle.  On palpation there are no dominant masses. There is no warmth, tenderness or fluctuance in the breast. There is no nipple discharge.    Assessment and Plan     Patient Active Problem List   Diagnosis    Type 2 diabetes mellitus with hyperglycemia, without long-term current use of insulin    Primary hypertension    Multiple thyroid nodules    Mixed hyperlipidemia    Wellness examination    Class 3 severe obesity due to excess calories with serious comorbidity and body mass index (BMI) of 40.0 to 44.9 in adult    GI symptoms       Ailyn was seen today for follow-up.    Diagnoses and all orders for this visit:    At high risk for breast cancer    Family history of breast cancer    Breast cancer screening, high risk patient     --------------------------------------------------------------------------------------------------------------  Ailyn Ramos is a 53 y.o.female who is here today for high risk follow up.  There are no concerning findings on her breast exam or on recent imaging.  Today Ailyn Ramos was plugged into the [employer] risk calculator and her calculated risk of breast cancer based on Tyrer - Cuzick Breast Cancer Risk Model was 34%.  She understands that >20% is considered high risk.  Today  we again discussed risk factors associated with the development of breast cancer and risk reduction strategies.       PLAN:     1. Lifestyle - Healthy lifestyle guidelines were reviewed. She was encouraged to engage in regular exercise, maintain a healthy body weight, and avoid excessive alcohol consumption. Healthy nutritional guidelines were also discussed.      2. Imaging: She would like to continue with contrast enhanced mammography and is due November 2024.  This was ordered today.    3. Recommend 2 clinical breast exams/year.  One with her OBGYN and 1 with the breast center.  RTC in 1 year.     The patient was encouraged to continue her self breast exam. If she notes any changes or has any concerns with her breast in the interim she was encouraged to call the breast center to schedule an appointment as soon as possible. All of her questions were answered.     Ioana Painter MD  Breast Surgical Oncologist     I spent a total of 25 minutes on the day of the visit.  This includes face to face time and non-face to face time preparing to see the patient (eg, review of tests), obtaining and/or reviewing separately obtained history, documenting clinical information in the electronic or other health record, independently interpreting results and communicating results to the patient/family/caregiver, or care coordinator.

## 2024-04-11 ENCOUNTER — TELEPHONE (OUTPATIENT)
Dept: INTERNAL MEDICINE | Facility: CLINIC | Age: 54
End: 2024-04-11
Payer: COMMERCIAL

## 2024-04-11 NOTE — TELEPHONE ENCOUNTER
Refaxed referral and information to [facility]. [phone]. Called office and they will schedule. Let patient know and she was provided with their office number also.

## 2024-04-11 NOTE — TELEPHONE ENCOUNTER
Please double check that referral was sent for Dr. Ricketts endocrinology for multinodular thyroid evaluation

## 2024-04-11 NOTE — TELEPHONE ENCOUNTER
----- Message from Nickiekhoi Sue sent at 4/11/2024 10:30 AM CDT -----  Regarding: advice  Type:  Needs Medical Advice    Who Called: pt    Best Call Back Number: [phone]  Additional Information: stated that she got a referral to have a biopsy done on her thyroid and was told to wait a couple of days for a phone call, but hasn't received anything. Stated that it has been over a week. Please advise

## 2024-04-23 ENCOUNTER — TELEPHONE (OUTPATIENT)
Dept: INTERNAL MEDICINE | Facility: CLINIC | Age: 54
End: 2024-04-23
Payer: COMMERCIAL

## 2024-04-23 NOTE — TELEPHONE ENCOUNTER
Spoke with Pt, Pt was concerned that she did not have an appt with Endocrinology till August. Pt was under the impression she needed a biopsy. I read her the notes Cody wrote   Noted multinodular thyroid.  Some change in size.  This has been ongoing for several years, however will refer to endocrinology for formal evaluation.  No history of prior biopsies per patient.)        Pt felt better once we discussed, that we were being proactive now that a nodule has grown some. It was decided Pt see an Endocrinologist for safety. Pt was educated Endocrinology will most likely do there own US, and go forward from there. Nowhere in Pt chart does it say she needs a biopsy now. Pt comfortable once the call was finished.

## 2024-04-23 NOTE — TELEPHONE ENCOUNTER
----- Message from Le Kurtz sent at 4/23/2024 11:24 AM CDT -----  .Type:  Patient Returning Call    Who Called:pt  Who Left Message for Patient:pt  Does the patient know what this is regarding?: Thyroid Biopsy   Would the patient rather a call back or a response via [facility]?   Best Call Back Number:[phone]  Additional Information: Please call about  Thyroid Biopsy

## 2024-05-14 DIAGNOSIS — E11.65 TYPE 2 DIABETES MELLITUS WITH HYPERGLYCEMIA, WITHOUT LONG-TERM CURRENT USE OF INSULIN: Chronic | ICD-10-CM

## 2024-05-14 DIAGNOSIS — E66.01 CLASS 3 SEVERE OBESITY DUE TO EXCESS CALORIES WITH SERIOUS COMORBIDITY AND BODY MASS INDEX (BMI) OF 40.0 TO 44.9 IN ADULT: ICD-10-CM

## 2024-05-15 RX ORDER — TIRZEPATIDE 2.5 MG/.5ML
INJECTION, SOLUTION SUBCUTANEOUS
Qty: 6 ML | Refills: 3 | Status: SHIPPED | OUTPATIENT
Start: 2024-05-15

## 2024-05-27 PROBLEM — Z00.00 WELLNESS EXAMINATION: Status: RESOLVED | Noted: 2024-02-21 | Resolved: 2024-05-27

## 2024-06-17 ENCOUNTER — PATIENT OUTREACH (OUTPATIENT)
Facility: CLINIC | Age: 54
End: 2024-06-17
Payer: COMMERCIAL

## 2024-06-17 NOTE — PROGRESS NOTES
Population Health Outreach. Health Maintenance Topic(s) Outreach Outcomes & Actions Taken:    Blood Pressure - Outreach Outcomes & Actions Taken  : Primary Care Follow Up Visit Scheduled     Lab(s) - Outreach Outcomes & Actions Taken  : Overdue Lab(s) Ordered     Additional Notes:  PCP appointment: 07/01/24 for lab f/u   She has future orders listed for labs due.     /86 @ [facility] but overall BP average is within normal limits. She has a PCP appointment scheduled in 2 weeks.

## 2024-06-27 ENCOUNTER — LAB VISIT (OUTPATIENT)
Dept: LAB | Facility: HOSPITAL | Age: 54
End: 2024-06-27
Payer: COMMERCIAL

## 2024-06-27 DIAGNOSIS — Z00.00 WELLNESS EXAMINATION: ICD-10-CM

## 2024-06-27 DIAGNOSIS — E04.2 MULTINODULAR THYROID: ICD-10-CM

## 2024-06-27 DIAGNOSIS — I10 PRIMARY HYPERTENSION: ICD-10-CM

## 2024-06-27 DIAGNOSIS — E11.65 TYPE 2 DIABETES MELLITUS WITH HYPERGLYCEMIA, WITHOUT LONG-TERM CURRENT USE OF INSULIN: ICD-10-CM

## 2024-06-27 DIAGNOSIS — E66.01 CLASS 3 SEVERE OBESITY DUE TO EXCESS CALORIES WITH SERIOUS COMORBIDITY AND BODY MASS INDEX (BMI) OF 40.0 TO 44.9 IN ADULT: ICD-10-CM

## 2024-06-27 DIAGNOSIS — E78.2 MIXED HYPERLIPIDEMIA: ICD-10-CM

## 2024-06-27 DIAGNOSIS — E04.2 MULTIPLE THYROID NODULES: ICD-10-CM

## 2024-06-27 LAB
25(OH)D3+25(OH)D2 SERPL-MCNC: 61 NG/ML (ref 30–80)
ALBUMIN SERPL-MCNC: 3.8 G/DL (ref 3.5–5)
ALBUMIN/GLOB SERPL: 1.1 RATIO (ref 1.1–2)
ALP SERPL-CCNC: 72 UNIT/L (ref 40–150)
ALT SERPL-CCNC: 35 UNIT/L (ref 0–55)
ANION GAP SERPL CALC-SCNC: 11 MEQ/L
AST SERPL-CCNC: 21 UNIT/L (ref 5–34)
BASOPHILS # BLD AUTO: 0.04 X10(3)/MCL
BASOPHILS NFR BLD AUTO: 0.4 %
BILIRUB SERPL-MCNC: 0.8 MG/DL
BUN SERPL-MCNC: 12.4 MG/DL (ref 9.8–20.1)
CALCIUM SERPL-MCNC: 9.8 MG/DL (ref 8.4–10.2)
CHLORIDE SERPL-SCNC: 102 MMOL/L (ref 98–107)
CHOLEST SERPL-MCNC: 222 MG/DL
CHOLEST/HDLC SERPL: 5 {RATIO} (ref 0–5)
CO2 SERPL-SCNC: 25 MMOL/L (ref 22–29)
CREAT SERPL-MCNC: 0.7 MG/DL (ref 0.55–1.02)
CREAT UR-MCNC: 207.4 MG/DL (ref 45–106)
CREAT/UREA NIT SERPL: 18
EOSINOPHIL # BLD AUTO: 0.09 X10(3)/MCL (ref 0–0.9)
EOSINOPHIL NFR BLD AUTO: 1 %
ERYTHROCYTE [DISTWIDTH] IN BLOOD BY AUTOMATED COUNT: 14.1 % (ref 11.5–17)
EST. AVERAGE GLUCOSE BLD GHB EST-MCNC: 154.2 MG/DL
GFR SERPLBLD CREATININE-BSD FMLA CKD-EPI: >60 ML/MIN/1.73/M2
GLOBULIN SER-MCNC: 3.5 GM/DL (ref 2.4–3.5)
GLUCOSE SERPL-MCNC: 175 MG/DL (ref 74–100)
HBA1C MFR BLD: 7 %
HCT VFR BLD AUTO: 43.3 % (ref 37–47)
HDLC SERPL-MCNC: 49 MG/DL (ref 35–60)
HGB BLD-MCNC: 14.4 G/DL (ref 12–16)
IMM GRANULOCYTES # BLD AUTO: 0.04 X10(3)/MCL (ref 0–0.04)
IMM GRANULOCYTES NFR BLD AUTO: 0.4 %
LDLC SERPL CALC-MCNC: 143 MG/DL (ref 50–140)
LYMPHOCYTES # BLD AUTO: 2.51 X10(3)/MCL (ref 0.6–4.6)
LYMPHOCYTES NFR BLD AUTO: 26.6 %
MCH RBC QN AUTO: 28.9 PG (ref 27–31)
MCHC RBC AUTO-ENTMCNC: 33.3 G/DL (ref 33–36)
MCV RBC AUTO: 86.9 FL (ref 80–94)
MICROALBUMIN UR-MCNC: 21.3 UG/ML
MICROALBUMIN/CREAT RATIO PNL UR: 10.3 MG/GM CR (ref 0–30)
MONOCYTES # BLD AUTO: 0.65 X10(3)/MCL (ref 0.1–1.3)
MONOCYTES NFR BLD AUTO: 6.9 %
NEUTROPHILS # BLD AUTO: 6.1 X10(3)/MCL (ref 2.1–9.2)
NEUTROPHILS NFR BLD AUTO: 64.7 %
NRBC BLD AUTO-RTO: 0 %
PLATELET # BLD AUTO: 320 X10(3)/MCL (ref 130–400)
PMV BLD AUTO: 9.4 FL (ref 7.4–10.4)
POTASSIUM SERPL-SCNC: 4.6 MMOL/L (ref 3.5–5.1)
PROT SERPL-MCNC: 7.3 GM/DL (ref 6.4–8.3)
RBC # BLD AUTO: 4.98 X10(6)/MCL (ref 4.2–5.4)
SODIUM SERPL-SCNC: 138 MMOL/L (ref 136–145)
T3 SERPL-MCNC: 128.7 NG/DL (ref 60–180)
T4 FREE SERPL-MCNC: 1.1 NG/DL (ref 0.7–1.48)
T4 SERPL-MCNC: 10.33 UG/DL (ref 4.87–11.72)
TRIGL SERPL-MCNC: 149 MG/DL (ref 37–140)
TSH SERPL-ACNC: 0.75 UIU/ML (ref 0.35–4.94)
VLDLC SERPL CALC-MCNC: 30 MG/DL
WBC # BLD AUTO: 9.43 X10(3)/MCL (ref 4.5–11.5)

## 2024-06-27 PROCEDURE — 80061 LIPID PANEL: CPT

## 2024-06-27 PROCEDURE — 84480 ASSAY TRIIODOTHYRONINE (T3): CPT

## 2024-06-27 PROCEDURE — 82570 ASSAY OF URINE CREATININE: CPT

## 2024-06-27 PROCEDURE — 36415 COLL VENOUS BLD VENIPUNCTURE: CPT

## 2024-06-27 PROCEDURE — 84439 ASSAY OF FREE THYROXINE: CPT

## 2024-06-27 PROCEDURE — 86376 MICROSOMAL ANTIBODY EACH: CPT

## 2024-06-27 PROCEDURE — 80053 COMPREHEN METABOLIC PANEL: CPT

## 2024-06-27 PROCEDURE — 83036 HEMOGLOBIN GLYCOSYLATED A1C: CPT

## 2024-06-27 PROCEDURE — 85025 COMPLETE CBC W/AUTO DIFF WBC: CPT

## 2024-06-27 PROCEDURE — 84443 ASSAY THYROID STIM HORMONE: CPT

## 2024-06-27 PROCEDURE — 82306 VITAMIN D 25 HYDROXY: CPT

## 2024-06-27 PROCEDURE — 84436 ASSAY OF TOTAL THYROXINE: CPT

## 2024-06-28 LAB — THYROPEROXIDASE AB SERPL-ACNC: 0.8 IU/ML

## 2024-07-01 ENCOUNTER — OFFICE VISIT (OUTPATIENT)
Dept: INTERNAL MEDICINE | Facility: CLINIC | Age: 54
End: 2024-07-01
Payer: COMMERCIAL

## 2024-07-01 VITALS
SYSTOLIC BLOOD PRESSURE: 128 MMHG | HEART RATE: 85 BPM | WEIGHT: 254 LBS | HEIGHT: 65 IN | OXYGEN SATURATION: 97 % | BODY MASS INDEX: 42.32 KG/M2 | DIASTOLIC BLOOD PRESSURE: 76 MMHG

## 2024-07-01 DIAGNOSIS — E11.51 TYPE 2 DIABETES MELLITUS WITH DIABETIC PERIPHERAL ANGIOPATHY WITHOUT GANGRENE, WITHOUT LONG-TERM CURRENT USE OF INSULIN: ICD-10-CM

## 2024-07-01 DIAGNOSIS — E11.65 TYPE 2 DIABETES MELLITUS WITH HYPERGLYCEMIA, WITHOUT LONG-TERM CURRENT USE OF INSULIN: Chronic | ICD-10-CM

## 2024-07-01 DIAGNOSIS — E04.2 MULTIPLE THYROID NODULES: Chronic | ICD-10-CM

## 2024-07-01 DIAGNOSIS — I10 PRIMARY HYPERTENSION: Chronic | ICD-10-CM

## 2024-07-01 DIAGNOSIS — H66.002 NON-RECURRENT ACUTE SUPPURATIVE OTITIS MEDIA OF LEFT EAR WITHOUT SPONTANEOUS RUPTURE OF TYMPANIC MEMBRANE: ICD-10-CM

## 2024-07-01 DIAGNOSIS — E66.01 CLASS 3 SEVERE OBESITY DUE TO EXCESS CALORIES WITH SERIOUS COMORBIDITY AND BODY MASS INDEX (BMI) OF 40.0 TO 44.9 IN ADULT: Chronic | ICD-10-CM

## 2024-07-01 DIAGNOSIS — Z00.00 WELLNESS EXAMINATION: Primary | ICD-10-CM

## 2024-07-01 DIAGNOSIS — E78.2 MIXED HYPERLIPIDEMIA: ICD-10-CM

## 2024-07-01 PROBLEM — R19.8 GI SYMPTOMS: Status: RESOLVED | Noted: 2024-02-21 | Resolved: 2024-07-01

## 2024-07-01 PROCEDURE — 3066F NEPHROPATHY DOC TX: CPT | Mod: CPTII,,,

## 2024-07-01 PROCEDURE — 3061F NEG MICROALBUMINURIA REV: CPT | Mod: CPTII,,,

## 2024-07-01 PROCEDURE — 3074F SYST BP LT 130 MM HG: CPT | Mod: CPTII,,,

## 2024-07-01 PROCEDURE — 1160F RVW MEDS BY RX/DR IN RCRD: CPT | Mod: CPTII,,,

## 2024-07-01 PROCEDURE — 3051F HG A1C>EQUAL 7.0%<8.0%: CPT | Mod: CPTII,,,

## 2024-07-01 PROCEDURE — 3078F DIAST BP <80 MM HG: CPT | Mod: CPTII,,,

## 2024-07-01 PROCEDURE — 99396 PREV VISIT EST AGE 40-64: CPT | Mod: ,,,

## 2024-07-01 PROCEDURE — 1159F MED LIST DOCD IN RCRD: CPT | Mod: CPTII,,,

## 2024-07-01 PROCEDURE — 4010F ACE/ARB THERAPY RXD/TAKEN: CPT | Mod: CPTII,,,

## 2024-07-01 PROCEDURE — 3008F BODY MASS INDEX DOCD: CPT | Mod: CPTII,,,

## 2024-07-01 RX ORDER — TIRZEPATIDE 5 MG/.5ML
5 INJECTION, SOLUTION SUBCUTANEOUS
Qty: 2 ML | Refills: 5 | Status: SHIPPED | OUTPATIENT
Start: 2024-07-01 | End: 2024-12-28

## 2024-07-01 RX ORDER — AMOXICILLIN AND CLAVULANATE POTASSIUM 875; 125 MG/1; MG/1
1 TABLET, FILM COATED ORAL EVERY 12 HOURS
Qty: 14 TABLET | Refills: 0 | Status: SHIPPED | OUTPATIENT
Start: 2024-07-01 | End: 2024-07-08

## 2024-07-01 RX ORDER — ROSUVASTATIN CALCIUM 10 MG/1
10 TABLET, COATED ORAL DAILY
Qty: 90 TABLET | Refills: 3 | Status: SHIPPED | OUTPATIENT
Start: 2024-07-01 | End: 2025-07-01

## 2024-07-01 NOTE — ASSESSMENT & PLAN NOTE
-patient feeling generally well today  -will obtain wellness labs within next few days  -age-appropriate screenings up-to-date  -immunizations reviewed and discussed  -encourage routine aerobic exercise 2 to 3 times a week  -increase fluid hydration

## 2024-07-01 NOTE — ASSESSMENT & PLAN NOTE
"  Physical Therapy Daily Treatment Note     Name: Cecilio Cespedes  Clinic Number: [id number]    Therapy Diagnosis:   Encounter Diagnoses   Name Primary?    Decreased range of motion of lumbar spine     Acute bilateral low back pain without sciatica     Limitation of joint motion of lumbar spine     Impairment of balance     Gait abnormality      Physician: Mary Shelby PA-C    Visit Date: 4/3/2019  Evaluation Date: 3/22/2019  Authorization Period Expiration: 12/31/2019  Plan of Care Certification Period: 6/22/2019  Visit #/Visits authorized: 5 / 12     Time In: 10:01  Time Out: 10:48  Total Billable Time: 35 minutes    Precautions: Standard and Diabetes    Subjective     Pt reports: overall continues to feel better with less pain in the low back. Still feels a little tired with daily activities at home.   He was compliant with home exercise program.  Response to previous treatment: no better no worse  Functional change: none    Pain: 2/10 following manual techniques with PT  Location: low back, L more than R    Objective     Cecilio COUGHLIN received the following manual therapy techniques: Joint mobilizations and Soft tissue Mobilization were applied to the: lumbar spine for 10 minutes.  STM to lumbar paraspinals and QL in R sidleying   STM to lumbar paraspinals with fwd bend movement   (NP) PA mobilizations to L1 - L5   (NP) PA mobilization to TP     Gene received therapeutic exercises to develop strength, endurance, ROM, flexibility, posture and core stabilization for 25 minutes including:  LTR x 3" hold x 2 min  SKTC with towel behind knee 10 x 5" hold   (NP) Pelvic Tilts (ROM only) 15 x 5" hold   (NP) DKTC with towel behind knees 10 x 5" (Iva from PTA for stretch)  Hip add squeeze with ball 10 x 3"  Hip abd with GTB 3x10  Seated HSS 3 x 20"   Bridges x 10   Green ball roll outs for lumbar flexion 10 x 5"(performed with manual therapy)  (NP) Sit-stand from elevated mat    Patient participated in neuromuscular " BMI 42.2   -encourage low-calorie low carb diet   -encourage some form of routine aerobic exercise   -also on Mounjaro, increasing dose to hopefully help with further weight loss   "re-education activities to improve: Balance, Coordination, Sense, Proprioception and Posture for 10 minutes. The following activities were included:   FT EO x 30"   FT EC 2 x 30"   FT horizontal head turns x 30" on foam x 30" off foam   Marches on foam x 45" (pt reported onset of fatigue)   Heel Taps onto foam x 1 min      Education provided:   - patient to activate abdominals when performing transfers or daily activities in order to reduce lumbar compensation and onset of spasms.    Written Home Exercises Provided: Patient instructed to cont prior HEP.   Exercises were reviewed and Carl was able to demonstrate them prior to the end of the session.  Carl demonstrated good  understanding of the education provided.     Assessment     Carl demonstrated overall good tolerance to treatment this date without exacerbation of symptoms. He reported relief with STM with overall decreased tightness. Onset of fatigue with standing activities without onset of lumbar pain. Appeared to respond better to balance activities when performing them earlier in the session. Pt will benefit from continued progressive strengthening and balance activities as tolerated to increase endurance. Will progress as tolerated.   Carl is progressing well towards his goals.   Pt prognosis is Good.     Pt will continue to benefit from skilled outpatient physical therapy to address the deficits listed in the problem list box on initial evaluation, provide pt/family education and to maximize pt's level of independence in the home and community environment.     Pt's spiritual, cultural and educational needs considered and pt agreeable to plan of care and goals.    Anticipated barriers to physical therapy: none    Goals:   Short Term GOALS:  In 4 weeks, pt. will  Report decreased back pain < / = 5 /10 to increase tolerance for walking long distances  Pt will report > / = 30% decrease in difficulty tolerating prolonged standing in order to improve ability to " perform household chores.   Pt will be able to perform bridging x 5 without pain and clearing the gluts to improve standing tolerance   Pt to tolerate HEP to improve ROM and independence with ADL's        Long Term GOALS:  In 8 weeks, pt. Will:  Report decreased low back pain < / = 2 /10 to increase tolerance for walking  Pt will perform 5 sit <> stand transfers without UE support to increase independence and tolerance for activity   Increased lumbar flexion and extension ROM to 70% for increased ease with daily activities  - be independent with HEP and SX management     Plan   Plan to continue with established POC toward current PT goals.    Aleyda Dawson, PTA

## 2024-07-01 NOTE — PROGRESS NOTES
"    Patient ID: Ailyn Ramos is a 53 y.o. female.    Chief Complaint: Annual Exam (Annual wellness- no complaints or concerns.)    Ailyn Ramos is a 53 y.o. female, known to Dr Sawyer, presents today for a wellness visit.  Medical comorbidities include HTN, dm 2, thyroid nodules, obesity.  Also followed by high-risk breast clinic for significant family history  breast cancer in mother (at 73 y.o.) and sister (at 54 y.o.).   Since last visit patient reports has been fairly well without acute injury or major illness.  Most recent wellness labs reviewed and generally stable.  Does have elevated  hemoglobin A1c 7.0, improved from prior 7.9.  Reports compliance currently on Mounjaro 2.5 mg weekly tolerating well without GI side effects.  Previously on Ozempic with some loose bowels.  BMI 42.2, improved from previous  BMI 43.7.  Also noted to again have persistently elevated lipid profile, for which we will initiate on rosuvastatin.  Blood pressure well-controlled on current regimen.  Age-appropriate immunizations discussed, plans on obtaining the Shingrix vaccine at next visit.  Otherwise generally stable for today's visit.         Wellness:  Today 07/01/2024          MEDICAL HISTORY:    Past Medical History:   Diagnosis Date    Conductive hearing loss     Knee pain     Mixed hyperlipidemia 03/10/2023    Multiple thyroid nodules     "Normal size thyroid gland with small nodular scattered throughout both lobes and no new nodule identified.  The largest thyroid nodule is a solid hypoechoic nodule at the posterior right mid thyroid lobe measuring 1.2 cm in greatest dimension which is only minimally enlarged dating back to a thyroid ultrasound 03/22/2017."  Date:   03/20/2023    Perforated tympanic membrane     Primary hypertension     Type 2 diabetes mellitus with hyperglycemia, without long-term current use of insulin 10/05/2022      Past Surgical History:   Procedure Laterality Date     diagnostic " "arthroscopic procedures of knee joint      Repair, primary, disrupted ligament, ankle; both collateral ligaments      TYMPANOPLASTY        Social History     Tobacco Use    Smoking status: Never    Smokeless tobacco: Never   Substance Use Topics    Alcohol use: Yes     Alcohol/week: 6.0 standard drinks of alcohol     Types: 6 Cans of beer per week     Comment: Social drinker sometimes I go months and dont drink at all    Drug use: Not Currently          Health Maintenance Due   Topic Date Due    Pneumococcal Vaccines (Age 0-64) (1 of 2 - PCV) Never done    TETANUS VACCINE  Never done    Low Dose Statin  Never done    Shingles Vaccine (1 of 2) Never done    COVID-19 Vaccine (1 - 2023-24 season) Never done    Foot Exam  02/06/2024          Patient Care Team:  Chela Sawyer MD as PCP - General (Internal Medicine)  Jesenia Montgomery MD as Consulting Physician (Obstetrics and Gynecology)  Edith Mauricio RN as Diabetes Educator (Diabetes)  Ping Rothman MD as Consulting Physician (Ophthalmology)  Alma Vigil LPN as Care Coordinator      Review of Systems   Constitutional:  Negative for fatigue and fever.   HENT:  Negative for congestion, rhinorrhea, sore throat and trouble swallowing.    Eyes:  Negative for redness and visual disturbance.   Respiratory:  Negative for cough, chest tightness and shortness of breath.    Cardiovascular:  Negative for chest pain and palpitations.   Gastrointestinal:  Negative for abdominal pain, constipation, diarrhea, nausea and vomiting.   Genitourinary:  Negative for dysuria, flank pain, frequency and urgency.   Musculoskeletal:  Negative for arthralgias, gait problem and myalgias.   Skin:  Negative for rash and wound.   Neurological:  Negative for facial asymmetry, speech difficulty, weakness and headaches.   All other systems reviewed and are negative.      Objective:   /76 (BP Location: Left arm)   Pulse 85   Ht 5' 5" (1.651 m)   Wt 115.2 kg (254 lb)   " SpO2 97%   BMI 42.27 kg/m²      Physical Exam  Constitutional:       General: She is not in acute distress.     Appearance: Normal appearance. She is obese.   HENT:      Right Ear: Tympanic membrane, ear canal and external ear normal.      Left Ear: Tympanic membrane, ear canal and external ear normal.      Nose: Nose normal.      Mouth/Throat:      Mouth: Mucous membranes are moist.      Pharynx: Oropharynx is clear.   Eyes:      Extraocular Movements: Extraocular movements intact.      Conjunctiva/sclera: Conjunctivae normal.      Pupils: Pupils are equal, round, and reactive to light.   Cardiovascular:      Rate and Rhythm: Normal rate and regular rhythm.      Pulses: Normal pulses.      Heart sounds: Normal heart sounds. No murmur heard.     No gallop.   Pulmonary:      Effort: Pulmonary effort is normal.      Breath sounds: Normal breath sounds. No wheezing.   Abdominal:      General: Bowel sounds are normal. There is no distension.      Palpations: Abdomen is soft. There is no mass.      Tenderness: There is no abdominal tenderness. There is no guarding.   Musculoskeletal:         General: Normal range of motion.   Skin:     General: Skin is warm and dry.   Neurological:      Mental Status: She is alert. Mental status is at baseline.      Sensory: No sensory deficit.      Motor: No weakness.           Assessment:       ICD-10-CM ICD-9-CM   1. Wellness examination  Z00.00 V70.0   2. Mixed hyperlipidemia  E78.2 272.2   3. Type 2 diabetes mellitus with diabetic peripheral angiopathy without gangrene, without long-term current use of insulin  E11.51 250.70     443.81   4. Non-recurrent acute suppurative otitis media of left ear without spontaneous rupture of tympanic membrane  H66.002 382.00   5. Primary hypertension  I10 401.9   6. Type 2 diabetes mellitus with hyperglycemia, without long-term current use of insulin  E11.65 250.00     790.29   7. Multiple thyroid nodules  E04.2 241.1   8. Class 3 severe obesity  due to excess calories with serious comorbidity and body mass index (BMI) of 40.0 to 44.9 in adult  E66.01 278.01    Z68.41 V85.41        Plan:     Problem List Items Addressed This Visit          ENT    Non-recurrent acute suppurative otitis media of left ear without spontaneous rupture of tympanic membrane     -acute   -initiate Augmentin b.i.d.   -okay to utilize OTC antihistamine daily         Relevant Medications    amoxicillin-clavulanate 875-125mg (AUGMENTIN) 875-125 mg per tablet       Cardiac/Vascular    Primary hypertension (Chronic)     -stable   -currently on lisinopril 5 mg, continue   -low-sodium diet         Mixed hyperlipidemia (Chronic)     -acute on chronic   -initiate rosuvastatin 10 mg daily         Relevant Medications    rosuvastatin (CRESTOR) 10 MG tablet       Endocrine    Type 2 diabetes mellitus with hyperglycemia, without long-term current use of insulin (Chronic)     -HGB A1c 7.0  -currently on Mounjaro 2.5 mg, increase to 5 mg weekly  -stressed importance of low-carbohydrate diet         Relevant Medications    tirzepatide (MOUNJARO) 5 mg/0.5 mL PnIj    rosuvastatin (CRESTOR) 10 MG tablet    Multiple thyroid nodules (Chronic)     -established with endocrinology  -no need for biopsy at this time         Class 3 severe obesity due to excess calories with serious comorbidity and body mass index (BMI) of 40.0 to 44.9 in adult (Chronic)     BMI 42.2   -encourage low-calorie low carb diet   -encourage some form of routine aerobic exercise   -also on Mounjaro, increasing dose to hopefully help with further weight loss         Type 2 diabetes mellitus with diabetic peripheral angiopathy without gangrene, without long-term current use of insulin    Relevant Medications    tirzepatide (MOUNJARO) 5 mg/0.5 mL PnIj    rosuvastatin (CRESTOR) 10 MG tablet       Other    Wellness examination - Primary     -patient feeling generally well today  -will obtain wellness labs within next few  days  -age-appropriate screenings up-to-date  -immunizations reviewed and discussed  -encourage routine aerobic exercise 2 to 3 times a week  -increase fluid hydration                Follow up in about 4 months (around 11/1/2024) for Diabetes, Shingles Vaccine.   -plan specifics discussed above    Orders Placed This Encounter    tirzepatide (MOUNJARO) 5 mg/0.5 mL PnIj    rosuvastatin (CRESTOR) 10 MG tablet    amoxicillin-clavulanate 875-125mg (AUGMENTIN) 875-125 mg per tablet        Medication List with Changes/Refills   New Medications    AMOXICILLIN-CLAVULANATE 875-125MG (AUGMENTIN) 875-125 MG PER TABLET    Take 1 tablet by mouth every 12 (twelve) hours. for 7 days    ROSUVASTATIN (CRESTOR) 10 MG TABLET    Take 1 tablet (10 mg total) by mouth once daily.    TIRZEPATIDE (MOUNJARO) 5 MG/0.5 ML PNIJ    Inject 5 mg into the skin every 7 days.   Current Medications    ASCORBIC ACID, VITAMIN C, (VITAMIN C) 100 MG TABLET    Take 100 mg by mouth once daily.    ASPIRIN (ECOTRIN) 81 MG EC TABLET    Take 81 mg by mouth Daily.    BLOOD SUGAR DIAGNOSTIC STRP    To check BG 1 times daily, to use with insurance preferred meter    BLOOD-GLUCOSE METER KIT    To check BG 1 times daily, to use with insurance preferred meter    FLUTICASONE PROPIONATE (FLONASE) 50 MCG/ACTUATION NASAL SPRAY    1 spray by Each Nostril route once daily. PRN    GINKGO BILOBA 120 MG TAB    Take 1 tablet by mouth Daily.    LANCETS MISC    To check BG 1 times daily, to use with insurance preferred meter    LISINOPRIL (PRINIVIL,ZESTRIL) 5 MG TABLET    TAKE 1 TABLET(5 MG) BY MOUTH EVERY DAY    MECOBALAMIN (B12 ACTIVE ORAL)    Take 1 tablet by mouth Daily.    METFORMIN (GLUCOPHAGE) 1000 MG TABLET    TAKE ONE-HALF TABLET BY MOUTH TWICE DAILY FOR 1 WEEK, THEN TAKE 1 TABLET(1000 MG) BY MOUTH TWICE DAILY WITH MEALS    MULTIVITAMIN (THERAGRAN) PER TABLET    Take 1 tablet by mouth once daily.    VITAMIN D (VITAMIN D3) 1000 UNITS TAB    Take 1,000 Units by mouth  Daily.    ZINC GLUCONATE 50 MG TABLET    Take 50 mg by mouth once daily.   Discontinued Medications    TIRZEPATIDE (MOUNJARO) 2.5 MG/0.5 ML PNIJ    ADMINISTER 2.5 MG UNDER THE SKIN EVERY 7 DAYS

## 2024-07-01 NOTE — ASSESSMENT & PLAN NOTE
-HGB A1c 7.0  -currently on Mounjaro 2.5 mg, increase to 5 mg weekly  -stressed importance of low-carbohydrate diet

## 2024-08-09 DIAGNOSIS — E66.9 DIABETES MELLITUS TYPE 2 IN OBESE: ICD-10-CM

## 2024-08-09 DIAGNOSIS — E11.69 DIABETES MELLITUS TYPE 2 IN OBESE: ICD-10-CM

## 2024-08-09 DIAGNOSIS — I10 PRIMARY HYPERTENSION: ICD-10-CM

## 2024-08-09 RX ORDER — LISINOPRIL 5 MG/1
5 TABLET ORAL
Qty: 90 TABLET | Refills: 3 | Status: SHIPPED | OUTPATIENT
Start: 2024-08-09

## 2024-08-09 RX ORDER — METFORMIN HYDROCHLORIDE 1000 MG/1
TABLET ORAL
Qty: 174 TABLET | Refills: 3 | Status: SHIPPED | OUTPATIENT
Start: 2024-08-09

## 2024-09-18 LAB
CHOLEST SERPL-MSCNC: 117 MG/DL (ref 0–200)
HBA1C MFR BLD: 6.8 % (ref 4–6)
HDLC SERPL-MCNC: 53 MG/DL (ref 35–70)
LDLC SERPL CALC-MCNC: 44 MG/DL (ref 0–160)
TRIGL SERPL-MCNC: 110 MG/DL (ref 40–160)

## 2024-09-30 PROBLEM — Z00.00 WELLNESS EXAMINATION: Status: RESOLVED | Noted: 2022-10-05 | Resolved: 2024-09-30

## 2024-10-07 ENCOUNTER — PATIENT OUTREACH (OUTPATIENT)
Facility: CLINIC | Age: 54
End: 2024-10-07
Payer: COMMERCIAL

## 2024-10-07 NOTE — PROGRESS NOTES
Health Maintenance Topic(s) Outreach Outcomes & Actions Taken:    Lab(s) - Outreach Outcomes & Actions Taken  : External Records Uploaded & Care Team Updated if Applicable       Additional Notes:  Upload lab results: Lipid, A1C

## 2024-10-08 ENCOUNTER — TELEPHONE (OUTPATIENT)
Dept: INTERNAL MEDICINE | Facility: CLINIC | Age: 54
End: 2024-10-08
Payer: COMMERCIAL

## 2024-10-08 NOTE — TELEPHONE ENCOUNTER
----- Message from Cris sent at 10/8/2024 10:19 AM CDT -----  .Who Called: Ailyn Ramos        Preferred Method of Contact: Phone Call  Patient's Preferred Phone Number on File: [phone]   Best Call Back Number, if different:  Additional Information: pt takes mounjaro and she having trouble with constipation and asking to send something to pharmacy  [facility] on [address]

## 2024-10-09 ENCOUNTER — TELEPHONE (OUTPATIENT)
Dept: INTERNAL MEDICINE | Facility: CLINIC | Age: 54
End: 2024-10-09

## 2025-01-07 ENCOUNTER — TELEPHONE (OUTPATIENT)
Dept: INTERNAL MEDICINE | Facility: CLINIC | Age: 55
End: 2025-01-07
Payer: COMMERCIAL

## 2025-01-07 DIAGNOSIS — E11.51 TYPE 2 DIABETES MELLITUS WITH DIABETIC PERIPHERAL ANGIOPATHY WITHOUT GANGRENE, WITHOUT LONG-TERM CURRENT USE OF INSULIN: Primary | ICD-10-CM

## 2025-01-07 DIAGNOSIS — E66.813 CLASS 3 SEVERE OBESITY DUE TO EXCESS CALORIES WITH SERIOUS COMORBIDITY AND BODY MASS INDEX (BMI) OF 40.0 TO 44.9 IN ADULT: ICD-10-CM

## 2025-01-07 DIAGNOSIS — E66.01 CLASS 3 SEVERE OBESITY DUE TO EXCESS CALORIES WITH SERIOUS COMORBIDITY AND BODY MASS INDEX (BMI) OF 40.0 TO 44.9 IN ADULT: ICD-10-CM

## 2025-01-07 DIAGNOSIS — I10 PRIMARY HYPERTENSION: ICD-10-CM

## 2025-01-07 RX ORDER — TIRZEPATIDE 2.5 MG/.5ML
2.5 INJECTION, SOLUTION SUBCUTANEOUS
Qty: 2 ML | Refills: 0 | Status: SHIPPED | OUTPATIENT
Start: 2025-01-07

## 2025-01-07 NOTE — TELEPHONE ENCOUNTER
Spoke with Pt, Pt has been informed we have sent a prescription of the Mounjaro 2.5 mg for 1 month, then she is to go up to the Mounjaro 5 mg. Pt will set herself up an appt through the portal for a follow up now that she has insurance again.

## 2025-01-07 NOTE — TELEPHONE ENCOUNTER
----- Message from [employer] sent at 1/7/2025  3:36 PM CST -----  .Who Called: Ailyn Ramos    Caller is requesting assistance/information from provider's office.    Symptoms (please be specific): n/a   How long has patient had these symptoms: n/a  List of preferred pharmacies on file (remove unneeded): [unfilled]  If different, enter pharmacy into here including location and phone number: n/a      Preferred Method of Contact: Phone Call    Patient's Preferred Phone Number on File: [phone]     Best Call Back Number, if different:    Additional Information: Pt is wanting stated she quit her job and health insurance lost insurance. Pt got her job back and her insurance has kicked in. Pt states in that time frame she was unable to get her tirzepatide (MOUNJARO) 5 mg/0.5 mL PnIj. Pt says she just requesting a refill from pharmacy, but wanting to know if it's okay for her to continue taking Mounjaro after being off for 2 1/2- 3mths. Please advise, thank you.

## 2025-01-07 NOTE — TELEPHONE ENCOUNTER
Pt has been off of her Mounjaro 5 mg for about 3 months. Does she need to start at the starter dosage or is refilling the 5mg ok. Please advise thank you

## 2025-01-07 NOTE — TELEPHONE ENCOUNTER
Patient would need to start back at the beginning since has been off for some time.  Okay to start Mounjaro 2.5 mg weekly.  If Mounjaro approved, may stop her metformin.

## 2025-01-08 ENCOUNTER — TELEPHONE (OUTPATIENT)
Dept: INTERNAL MEDICINE | Facility: CLINIC | Age: 55
End: 2025-01-08
Payer: COMMERCIAL

## 2025-01-08 NOTE — TELEPHONE ENCOUNTER
----- Message from [employer] sent at 1/8/2025  3:50 PM CST -----  .Type:  Patient Returning Call    Who Called:pt  Who Left Message for Patient:pt  Does the patient know what this is regarding?:missed call   Would the patient rather a call back or a response via [facility]?   Best Call Back Number:[phone]   Additional Information: Insurance has been uploaded on the portal

## 2025-03-25 DIAGNOSIS — E11.51 TYPE 2 DIABETES MELLITUS WITH DIABETIC PERIPHERAL ANGIOPATHY WITHOUT GANGRENE, WITHOUT LONG-TERM CURRENT USE OF INSULIN: ICD-10-CM

## 2025-03-25 DIAGNOSIS — I10 PRIMARY HYPERTENSION: Primary | ICD-10-CM

## 2025-03-31 ENCOUNTER — TELEPHONE (OUTPATIENT)
Dept: INTERNAL MEDICINE | Facility: CLINIC | Age: 55
End: 2025-03-31
Payer: COMMERCIAL

## 2025-03-31 NOTE — TELEPHONE ENCOUNTER
----- Message from Nurse Suresh sent at 3/25/2025 11:56 AM CDT -----  Regarding: PV for 4/7/25  Fasting labs for 4/7/25

## 2025-04-04 ENCOUNTER — LAB VISIT (OUTPATIENT)
Dept: LAB | Facility: HOSPITAL | Age: 55
End: 2025-04-04
Payer: COMMERCIAL

## 2025-04-04 DIAGNOSIS — E11.51 TYPE 2 DIABETES MELLITUS WITH DIABETIC PERIPHERAL ANGIOPATHY WITHOUT GANGRENE, WITHOUT LONG-TERM CURRENT USE OF INSULIN: ICD-10-CM

## 2025-04-04 DIAGNOSIS — I10 PRIMARY HYPERTENSION: ICD-10-CM

## 2025-04-04 LAB
ALBUMIN SERPL-MCNC: 3.7 G/DL (ref 3.5–5)
ALBUMIN/GLOB SERPL: 1 RATIO (ref 1.1–2)
ALP SERPL-CCNC: 60 UNIT/L (ref 40–150)
ALT SERPL-CCNC: 25 UNIT/L (ref 0–55)
ANION GAP SERPL CALC-SCNC: 10 MEQ/L
AST SERPL-CCNC: 17 UNIT/L (ref 11–45)
BASOPHILS # BLD AUTO: 0.04 X10(3)/MCL
BASOPHILS NFR BLD AUTO: 0.5 %
BILIRUB SERPL-MCNC: 0.4 MG/DL
BUN SERPL-MCNC: 8.8 MG/DL (ref 9.8–20.1)
CALCIUM SERPL-MCNC: 9.5 MG/DL (ref 8.4–10.2)
CHLORIDE SERPL-SCNC: 107 MMOL/L (ref 98–107)
CO2 SERPL-SCNC: 24 MMOL/L (ref 22–29)
CREAT SERPL-MCNC: 0.61 MG/DL (ref 0.55–1.02)
CREAT/UREA NIT SERPL: 14
EOSINOPHIL # BLD AUTO: 0.1 X10(3)/MCL (ref 0–0.9)
EOSINOPHIL NFR BLD AUTO: 1.4 %
ERYTHROCYTE [DISTWIDTH] IN BLOOD BY AUTOMATED COUNT: 14.1 % (ref 11.5–17)
EST. AVERAGE GLUCOSE BLD GHB EST-MCNC: 134.1 MG/DL
GFR SERPLBLD CREATININE-BSD FMLA CKD-EPI: >60 ML/MIN/1.73/M2
GLOBULIN SER-MCNC: 3.6 GM/DL (ref 2.4–3.5)
GLUCOSE SERPL-MCNC: 130 MG/DL (ref 74–100)
HBA1C MFR BLD: 6.3 %
HCT VFR BLD AUTO: 39.2 % (ref 37–47)
HGB BLD-MCNC: 12.8 G/DL (ref 12–16)
IMM GRANULOCYTES # BLD AUTO: 0.02 X10(3)/MCL (ref 0–0.04)
IMM GRANULOCYTES NFR BLD AUTO: 0.3 %
LYMPHOCYTES # BLD AUTO: 2.04 X10(3)/MCL (ref 0.6–4.6)
LYMPHOCYTES NFR BLD AUTO: 28 %
MCH RBC QN AUTO: 28.9 PG (ref 27–31)
MCHC RBC AUTO-ENTMCNC: 32.7 G/DL (ref 33–36)
MCV RBC AUTO: 88.5 FL (ref 80–94)
MONOCYTES # BLD AUTO: 0.55 X10(3)/MCL (ref 0.1–1.3)
MONOCYTES NFR BLD AUTO: 7.6 %
NEUTROPHILS # BLD AUTO: 4.53 X10(3)/MCL (ref 2.1–9.2)
NEUTROPHILS NFR BLD AUTO: 62.2 %
NRBC BLD AUTO-RTO: 0 %
PLATELET # BLD AUTO: 270 X10(3)/MCL (ref 130–400)
PMV BLD AUTO: 9.5 FL (ref 7.4–10.4)
POTASSIUM SERPL-SCNC: 4 MMOL/L (ref 3.5–5.1)
PROT SERPL-MCNC: 7.3 GM/DL (ref 6.4–8.3)
RBC # BLD AUTO: 4.43 X10(6)/MCL (ref 4.2–5.4)
SODIUM SERPL-SCNC: 141 MMOL/L (ref 136–145)
WBC # BLD AUTO: 7.28 X10(3)/MCL (ref 4.5–11.5)

## 2025-04-04 PROCEDURE — 85025 COMPLETE CBC W/AUTO DIFF WBC: CPT

## 2025-04-04 PROCEDURE — 83036 HEMOGLOBIN GLYCOSYLATED A1C: CPT

## 2025-04-04 PROCEDURE — 80053 COMPREHEN METABOLIC PANEL: CPT

## 2025-04-04 PROCEDURE — 36415 COLL VENOUS BLD VENIPUNCTURE: CPT

## 2025-04-05 ENCOUNTER — RESULTS FOLLOW-UP (OUTPATIENT)
Dept: INTERNAL MEDICINE | Facility: CLINIC | Age: 55
End: 2025-04-05

## 2025-04-07 ENCOUNTER — OFFICE VISIT (OUTPATIENT)
Dept: INTERNAL MEDICINE | Facility: CLINIC | Age: 55
End: 2025-04-07
Payer: COMMERCIAL

## 2025-04-07 VITALS
OXYGEN SATURATION: 98 % | SYSTOLIC BLOOD PRESSURE: 128 MMHG | DIASTOLIC BLOOD PRESSURE: 78 MMHG | HEIGHT: 66 IN | BODY MASS INDEX: 38.25 KG/M2 | HEART RATE: 82 BPM | WEIGHT: 238 LBS

## 2025-04-07 DIAGNOSIS — E04.2 MULTIPLE THYROID NODULES: Chronic | ICD-10-CM

## 2025-04-07 DIAGNOSIS — I10 PRIMARY HYPERTENSION: Chronic | ICD-10-CM

## 2025-04-07 DIAGNOSIS — Z23 NEED FOR PNEUMOCOCCAL VACCINATION: ICD-10-CM

## 2025-04-07 DIAGNOSIS — E66.01 CLASS 3 SEVERE OBESITY DUE TO EXCESS CALORIES WITH SERIOUS COMORBIDITY AND BODY MASS INDEX (BMI) OF 40.0 TO 44.9 IN ADULT: Chronic | ICD-10-CM

## 2025-04-07 DIAGNOSIS — E78.2 MIXED HYPERLIPIDEMIA: Chronic | ICD-10-CM

## 2025-04-07 DIAGNOSIS — E66.813 CLASS 3 SEVERE OBESITY DUE TO EXCESS CALORIES WITH SERIOUS COMORBIDITY AND BODY MASS INDEX (BMI) OF 40.0 TO 44.9 IN ADULT: Chronic | ICD-10-CM

## 2025-04-07 DIAGNOSIS — E11.65 TYPE 2 DIABETES MELLITUS WITH HYPERGLYCEMIA, WITHOUT LONG-TERM CURRENT USE OF INSULIN: Chronic | ICD-10-CM

## 2025-04-07 DIAGNOSIS — R22.42 LOCALIZED SWELLING OF LEFT LOWER LEG: ICD-10-CM

## 2025-04-07 DIAGNOSIS — M54.6 ACUTE BILATERAL THORACIC BACK PAIN: ICD-10-CM

## 2025-04-07 DIAGNOSIS — E11.65 TYPE 2 DIABETES MELLITUS WITH HYPERGLYCEMIA, WITHOUT LONG-TERM CURRENT USE OF INSULIN: Primary | Chronic | ICD-10-CM

## 2025-04-07 DIAGNOSIS — J30.89 ENVIRONMENTAL AND SEASONAL ALLERGIES: ICD-10-CM

## 2025-04-07 DIAGNOSIS — Z00.00 WELLNESS EXAMINATION: ICD-10-CM

## 2025-04-07 DIAGNOSIS — H66.002 NON-RECURRENT ACUTE SUPPURATIVE OTITIS MEDIA OF LEFT EAR WITHOUT SPONTANEOUS RUPTURE OF TYMPANIC MEMBRANE: Primary | ICD-10-CM

## 2025-04-07 DIAGNOSIS — J30.89 ENVIRONMENTAL AND SEASONAL ALLERGIES: Chronic | ICD-10-CM

## 2025-04-07 DIAGNOSIS — Z12.39 ENCOUNTER FOR SCREENING FOR MALIGNANT NEOPLASM OF BREAST, UNSPECIFIED SCREENING MODALITY: ICD-10-CM

## 2025-04-07 PROBLEM — G89.29 CHRONIC BILATERAL THORACIC BACK PAIN: Status: ACTIVE | Noted: 2025-04-07

## 2025-04-07 LAB
CHOLEST SERPL-MSCNC: 151 MG/DL (ref 0–200)
HBA1C MFR BLD: 6.5 % (ref 4–6)
HDLC SERPL-MCNC: 60 MG/DL (ref 35–70)
LDLC SERPL CALC-MCNC: 64 MG/DL (ref 0–160)
TRIGL SERPL-MCNC: 196 MG/DL (ref 40–160)

## 2025-04-07 PROCEDURE — 3078F DIAST BP <80 MM HG: CPT | Mod: CPTII,,,

## 2025-04-07 PROCEDURE — 90471 IMMUNIZATION ADMIN: CPT | Mod: ,,,

## 2025-04-07 PROCEDURE — 1160F RVW MEDS BY RX/DR IN RCRD: CPT | Mod: CPTII,,,

## 2025-04-07 PROCEDURE — 4010F ACE/ARB THERAPY RXD/TAKEN: CPT | Mod: CPTII,,,

## 2025-04-07 PROCEDURE — 90677 PCV20 VACCINE IM: CPT | Mod: ,,,

## 2025-04-07 PROCEDURE — 99214 OFFICE O/P EST MOD 30 MIN: CPT | Mod: 25,,,

## 2025-04-07 PROCEDURE — 3074F SYST BP LT 130 MM HG: CPT | Mod: CPTII,,,

## 2025-04-07 PROCEDURE — 3008F BODY MASS INDEX DOCD: CPT | Mod: CPTII,,,

## 2025-04-07 PROCEDURE — 3044F HG A1C LEVEL LT 7.0%: CPT | Mod: CPTII,,,

## 2025-04-07 PROCEDURE — 1159F MED LIST DOCD IN RCRD: CPT | Mod: CPTII,,,

## 2025-04-07 RX ORDER — MELOXICAM 15 MG/1
15 TABLET ORAL DAILY PRN
Qty: 7 TABLET | Refills: 0 | Status: SHIPPED | OUTPATIENT
Start: 2025-04-07 | End: 2025-04-14

## 2025-04-07 RX ORDER — GLIPIZIDE 2.5 MG/1
2.5 TABLET, EXTENDED RELEASE ORAL
COMMUNITY
Start: 2025-03-26 | End: 2025-04-07

## 2025-04-07 RX ORDER — TIRZEPATIDE 5 MG/.5ML
5 INJECTION, SOLUTION SUBCUTANEOUS WEEKLY
COMMUNITY
End: 2025-04-07

## 2025-04-07 RX ORDER — FLUTICASONE PROPIONATE 50 MCG
1 SPRAY, SUSPENSION (ML) NASAL DAILY PRN
Qty: 11.1 ML | Refills: 3 | Status: SHIPPED | OUTPATIENT
Start: 2025-04-07 | End: 2025-05-07

## 2025-04-07 RX ORDER — TIZANIDINE 4 MG/1
4 TABLET ORAL 2 TIMES DAILY PRN
Qty: 10 TABLET | Refills: 0 | Status: SHIPPED | OUTPATIENT
Start: 2025-04-07 | End: 2025-04-12

## 2025-04-07 NOTE — ASSESSMENT & PLAN NOTE
"Estimated body mass index is 38.41 kg/m² as calculated from the following:    Height as of this encounter: 5' 6" (1.676 m).    Weight as of this encounter: 108 kg (238 lb).   -BMI 38.4, down from previous 42.2   -currently on Mounjaro, continue   - Encourage exorcise for 30-45 min a day, 5x a week   - Log foods and track calories    - Eat lean meats (chicken, turkey, fish, lean ground beef)   - Avoid fried, fatty or processed foods.   - Avoid excessive carbohydrate rich foods (pasta, bread, rice, potatoes)    "

## 2025-04-07 NOTE — ASSESSMENT & PLAN NOTE
-suspect muscular strain   -initiate trial on meloxicam daily p.r.n. x7 days   -initiate trial tizanidine 4 mg b.i.d. p.r.n. short course   -encourage gentle range of motion/stretching of back   -encourage appropriate posture   -consider physical therapy and/or imaging if symptoms persist

## 2025-04-07 NOTE — ASSESSMENT & PLAN NOTE
Lab Results   Component Value Date    HGBA1C 6.3 04/04/2025    HGBA1C 6.8 (A) 09/18/2024    GLUCOSE 130 (H) 04/04/2025   -improved   -currently on Mounjaro 5 mg, increase to Mounjaro 7.5 mg   -recently added on glipizide 2.5 mg XR by external provider; discontinue since Mounjaro is being increase and more effective  -also on metformin 1000 b.i.d., plans on titrating down/off of as we increase Mounjaro dose for monotherapy  -low-carbohydrate diet   -encouraged to obtain diabetic eye exam yearly   -encouraged to obtain diabetic foot exam yearly

## 2025-04-07 NOTE — PROGRESS NOTES
"   Patient ID: Ailyn Ramos is a 54 y.o. female.    Chief Complaint: Follow-up (4 month follow up DM-Left foot swelling at times. Patient complaining of lower back pain at times. Wants to increase Mounjaro. )      Ailyn Ramos is a 54 y.o. female, known to Dr Sawyer, presents today for a diabetic follow up visit.  Medical comorbidities include HTN, dm 2, thyroid nodules, obesity.  Also followed by high-risk breast clinic for significant family history  breast cancer in mother (at 73 y.o.) and sister (at 54 y.o.).     Since last visit patient reports has been fairly well without acute injury or major illness.  Did have labs in insurance, this missed some appointments wanting to get back on track.  Most recent HGB A1c 6.3, improved from prior 6.8.  Currently on Mounjaro 5 mg weekly.  Also on metformin 1000 mg b.i.d. and recently initiated on glipizide XR 2.5 mg daily by external provider.  Per patient during loss of insurance, was utilizing free program through employer diabetes.  Requesting to have our clinic manage her diabetes instead, not interested in starting new oral medications would like Mounjaro increased instead.  BMI 38.4 improved from previous  BMI 43.7.  Other complaints include some unilateral localized left lower ankle/pedal edema.  Symptoms worsened throughout the day.  Denies calf pain prior injury/traumas.  Of note, patient does work on a boat with some occasional standing.    Wellness:07/01/2024      MEDICAL HISTORY:    Past Medical History:   Diagnosis Date    Conductive hearing loss     Knee pain     Mixed hyperlipidemia 03/10/2023    Multiple thyroid nodules     "Normal size thyroid gland with small nodular scattered throughout both lobes and no new nodule identified.  The largest thyroid nodule is a solid hypoechoic nodule at the posterior right mid thyroid lobe measuring 1.2 cm in greatest dimension which is only minimally enlarged dating back to a thyroid ultrasound " "03/22/2017."  Date:   03/20/2023    Perforated tympanic membrane     Primary hypertension     Type 2 diabetes mellitus with hyperglycemia, without long-term current use of insulin 10/05/2022      Past Surgical History:   Procedure Laterality Date     diagnostic arthroscopic procedures of knee joint      Repair, primary, disrupted ligament, ankle; both collateral ligaments      TYMPANOPLASTY        Social History[1]       Health Maintenance Due   Topic Date Due    TETANUS VACCINE  Never done    Shingles Vaccine (1 of 2) Never done    Foot Exam  02/06/2024    Influenza Vaccine (1) 09/01/2024    COVID-19 Vaccine (1 - 2024-25 season) Never done    Diabetic Eye Exam  10/12/2024    Mammogram  11/29/2024          Patient Care Team:  Chela Sawyer MD as PCP - General (Internal Medicine)  Jesenia Montgomery MD as Consulting Physician (Obstetrics and Gynecology)  Edith Mauricio RN as Diabetes Educator (Diabetes)  Ping Rothman MD as Consulting Physician (Ophthalmology)  Alma Vigil LPN as Care Coordinator      Review of Systems   Constitutional:  Negative for fatigue and fever.   HENT:  Negative for congestion, rhinorrhea, sore throat and trouble swallowing.    Eyes:  Negative for redness and visual disturbance.   Respiratory:  Negative for cough, chest tightness and shortness of breath.    Cardiovascular:  Positive for leg swelling (Left lower). Negative for chest pain and palpitations.   Gastrointestinal:  Negative for abdominal pain, constipation, diarrhea, nausea and vomiting.   Genitourinary:  Negative for dysuria, flank pain, frequency and urgency.   Musculoskeletal:  Positive for myalgias (Mid/lower back). Negative for arthralgias and gait problem.   Skin:  Negative for rash and wound.   Neurological:  Negative for facial asymmetry, speech difficulty, weakness and headaches.   All other systems reviewed and are negative.      Objective:   /78 (BP Location: Left arm, Patient Position: " "Sitting)   Pulse 82   Ht 5' 6" (1.676 m)   Wt 108 kg (238 lb)   SpO2 98%   BMI 38.41 kg/m²      Physical Exam  Constitutional:       General: She is not in acute distress.     Appearance: Normal appearance. She is obese.   HENT:      Right Ear: Tympanic membrane, ear canal and external ear normal.      Left Ear: Tympanic membrane, ear canal and external ear normal.      Nose: Nose normal.      Mouth/Throat:      Mouth: Mucous membranes are moist.      Pharynx: Oropharynx is clear.   Eyes:      Extraocular Movements: Extraocular movements intact.      Conjunctiva/sclera: Conjunctivae normal.      Pupils: Pupils are equal, round, and reactive to light.   Cardiovascular:      Rate and Rhythm: Normal rate and regular rhythm.      Pulses: Normal pulses.      Heart sounds: Normal heart sounds. No murmur heard.     No gallop.   Pulmonary:      Effort: Pulmonary effort is normal.      Breath sounds: Normal breath sounds. No wheezing.   Abdominal:      General: Bowel sounds are normal. There is no distension.      Palpations: Abdomen is soft. There is no mass.      Tenderness: There is no abdominal tenderness. There is no guarding.   Musculoskeletal:         General: Normal range of motion.      Right foot: Normal range of motion. No deformity or foot drop.      Left foot: Normal range of motion. No deformity or foot drop.   Feet:      Right foot:      Protective Sensation: 4 sites tested.  4 sites sensed.      Skin integrity: Skin integrity normal. No ulcer, skin breakdown or erythema.      Toenail Condition: Right toenails are normal.      Left foot:      Protective Sensation: 4 sites tested.  4 sites sensed.      Skin integrity: Skin integrity normal. No ulcer, skin breakdown or erythema.      Toenail Condition: Left toenails are normal.   Skin:     General: Skin is warm and dry.   Neurological:      Mental Status: She is alert. Mental status is at baseline.      Sensory: No sensory deficit.      Motor: No weakness. "           Assessment:       ICD-10-CM ICD-9-CM   1. Type 2 diabetes mellitus with hyperglycemia, without long-term current use of insulin  E11.65 250.00     790.29   2. Acute bilateral thoracic back pain  M54.6 724.1   3. Primary hypertension  I10 401.9   4. Mixed hyperlipidemia  E78.2 272.2   5. Class 3 severe obesity due to excess calories with serious comorbidity and body mass index (BMI) of 40.0 to 44.9 in adult  E66.813 278.01    E66.01 V85.41    Z68.41    6. Localized swelling of left lower leg  R22.42 782.2   7. Environmental and seasonal allergies  J30.89 477.8   8. Need for pneumococcal vaccination  Z23 V03.82   9. Encounter for screening for malignant neoplasm of breast, unspecified screening modality  Z12.39 V76.10        Plan:   1. Type 2 diabetes mellitus with hyperglycemia, without long-term current use of insulin  Assessment & Plan:  Lab Results   Component Value Date    HGBA1C 6.3 04/04/2025    HGBA1C 6.8 (A) 09/18/2024    GLUCOSE 130 (H) 04/04/2025   -improved   -currently on Mounjaro 5 mg, increase to Mounjaro 7.5 mg   -recently added on glipizide 2.5 mg XR by external provider; discontinue since Mounjaro is being increase and more effective  -also on metformin 1000 b.i.d., plans on titrating down/off of as we increase Mounjaro dose for monotherapy  -low-carbohydrate diet   -encouraged to obtain diabetic eye exam yearly   -encouraged to obtain diabetic foot exam yearly      Orders:  -     tirzepatide 7.5 mg/0.5 mL PnIj; Inject 7.5 mg into the skin every 7 days.  Dispense: 2 mL; Refill: 5    2. Acute bilateral thoracic back pain  Assessment & Plan:  -suspect muscular strain   -initiate trial on meloxicam daily p.r.n. x7 days   -initiate trial tizanidine 4 mg b.i.d. p.r.n. short course   -encourage gentle range of motion/stretching of back   -encourage appropriate posture   -consider physical therapy and/or imaging if symptoms persist    Orders:  -     meloxicam (MOBIC) 15 MG tablet; Take 1 tablet  "(15 mg total) by mouth daily as needed for Pain.  Dispense: 7 tablet; Refill: 0  -     tiZANidine (ZANAFLEX) 4 MG tablet; Take 1 tablet (4 mg total) by mouth 2 (two) times daily as needed (Muscle pain).  Dispense: 10 tablet; Refill: 0    3. Primary hypertension  Assessment & Plan:  -stable   -currently on lisinopril 5 mg, continue   -low-sodium diet      4. Mixed hyperlipidemia  Assessment & Plan:  -currently on rosuvastatin 10 mg q.d., continue   -low-cholesterol diet      5. Class 3 severe obesity due to excess calories with serious comorbidity and body mass index (BMI) of 40.0 to 44.9 in adult  Assessment & Plan:  Estimated body mass index is 38.41 kg/m² as calculated from the following:    Height as of this encounter: 5' 6" (1.676 m).    Weight as of this encounter: 108 kg (238 lb).   -BMI 38.4, down from previous 42.2   -currently on Mounjaro, continue   - Encourage exorcise for 30-45 min a day, 5x a week   - Log foods and track calories    - Eat lean meats (chicken, turkey, fish, lean ground beef)   - Avoid fried, fatty or processed foods.   - Avoid excessive carbohydrate rich foods (pasta, bread, rice, potatoes)        6. Localized swelling of left lower leg  -     US Lower Extremity Arteries Left; Future; Expected date: 04/07/2025  -     US Lower Extremity Veins Left; Future; Expected date: 04/07/2025    7. Environmental and seasonal allergies  Assessment & Plan:  -stable   -currently on fluticasone nasal, refill  -avoid triggers    Orders:  -     fluticasone propionate (FLONASE) 50 mcg/actuation nasal spray; 1 spray (50 mcg total) by Each Nostril route daily as needed for Allergies. PRN  Dispense: 11.1 mL; Refill: 3    8. Need for pneumococcal vaccination  -     pneumoc 20-manuel conj-dip cr(PF) (PREVNAR-20 (PF)) injection Syrg 0.5 mL    9. Encounter for screening for malignant neoplasm of breast, unspecified screening modality  -     Mammo Digital Screening Bilat w/ Amos (XPD); Future; Expected date: " 04/07/2025                 Follow up in about 4 months (around 8/7/2025) for Wellness with labs prior to visit, with Dr. Sawyer.   -plan specifics discussed above    Orders Placed This Encounter    Mammo Digital Screening Bilat w/ Amos (XPD)    US Lower Extremity Arteries Left    US Lower Extremity Veins Left    tirzepatide 7.5 mg/0.5 mL PnIj    pneumoc 20-manuel conj-dip cr(PF) (PREVNAR-20 (PF)) injection Syrg 0.5 mL    meloxicam (MOBIC) 15 MG tablet    tiZANidine (ZANAFLEX) 4 MG tablet    fluticasone propionate (FLONASE) 50 mcg/actuation nasal spray        Medication List with Changes/Refills   New Medications    MELOXICAM (MOBIC) 15 MG TABLET    Take 1 tablet (15 mg total) by mouth daily as needed for Pain.    TIRZEPATIDE 7.5 MG/0.5 ML PNIJ    Inject 7.5 mg into the skin every 7 days.    TIZANIDINE (ZANAFLEX) 4 MG TABLET    Take 1 tablet (4 mg total) by mouth 2 (two) times daily as needed (Muscle pain).   Current Medications    ASCORBIC ACID, VITAMIN C, (VITAMIN C) 100 MG TABLET    Take 100 mg by mouth once daily.    ASPIRIN (ECOTRIN) 81 MG EC TABLET    Take 81 mg by mouth Daily.    BLOOD SUGAR DIAGNOSTIC STRP    To check BG 1 times daily, to use with insurance preferred meter    BLOOD-GLUCOSE METER KIT    To check BG 1 times daily, to use with insurance preferred meter    GINKGO BILOBA 120 MG TAB    Take 1 tablet by mouth Daily.    LANCETS MISC    To check BG 1 times daily, to use with insurance preferred meter    LISINOPRIL (PRINIVIL,ZESTRIL) 5 MG TABLET    TAKE 1 TABLET(5 MG) BY MOUTH EVERY DAY    MECOBALAMIN (B12 ACTIVE ORAL)    Take 1 tablet by mouth Daily.    METFORMIN (GLUCOPHAGE) 1000 MG TABLET    TAKE 1/2 TABLET BY MOUTH TWICE DAILY FOR 1 WEEK THEN TAKE 1 TABLET(1000 MG) BY MOUTH TWICE DAILY WITH MEALS    MULTIVITAMIN (THERAGRAN) PER TABLET    Take 1 tablet by mouth once daily.    ROSUVASTATIN (CRESTOR) 10 MG TABLET    Take 1 tablet (10 mg total) by mouth once daily.    VITAMIN D (VITAMIN D3) 1000 UNITS  TAB    Take 1,000 Units by mouth Daily.    ZINC GLUCONATE 50 MG TABLET    Take 50 mg by mouth once daily.   Changed and/or Refilled Medications    Modified Medication Previous Medication    FLUTICASONE PROPIONATE (FLONASE) 50 MCG/ACTUATION NASAL SPRAY fluticasone propionate (FLONASE) 50 mcg/actuation nasal spray       1 spray (50 mcg total) by Each Nostril route daily as needed for Allergies. PRN    1 spray by Each Nostril route once daily. PRN   Discontinued Medications    GLIPIZIDE (GLUCOTROL) 2.5 MG TR24    Take 2.5 mg by mouth daily with breakfast.    MOUNJARO 5 MG/0.5 ML PNIJ    Inject 5 mg into the skin once a week.    TIRZEPATIDE (MOUNJARO) 2.5 MG/0.5 ML PNIJ    Inject 2.5 mg into the skin every 7 days.      This note was generated with the assistance of ambient listening technology. I attest to having reviewed and edited the generated note for accuracy, though some syntax or spelling errors may persist. Please contact the author of this note for any clarification.          [1]   Social History  Tobacco Use    Smoking status: Never    Smokeless tobacco: Never   Substance Use Topics    Alcohol use: Yes     Alcohol/week: 6.0 standard drinks of alcohol     Types: 6 Cans of beer per week     Comment: Social drinker sometimes I go months and dont drink at all    Drug use: Not Currently

## 2025-05-09 ENCOUNTER — TELEPHONE (OUTPATIENT)
Dept: INTERNAL MEDICINE | Facility: CLINIC | Age: 55
End: 2025-05-09
Payer: COMMERCIAL

## 2025-05-09 ENCOUNTER — RESULTS FOLLOW-UP (OUTPATIENT)
Dept: INTERNAL MEDICINE | Facility: CLINIC | Age: 55
End: 2025-05-09
Payer: COMMERCIAL

## 2025-05-09 NOTE — TELEPHONE ENCOUNTER
----- Message from PEPITO Henao sent at 5/9/2025 11:29 AM CDT -----  Please inform patient of results.    1. Ultrasound lower extremity reviewed.  No evidence of DVT in the left lower extremity.    ----- Message -----  From: Interface, Rad Results In  Sent: 5/9/2025  10:48 AM CDT  To: PEPITO Kennedy

## 2025-06-03 ENCOUNTER — PATIENT OUTREACH (OUTPATIENT)
Facility: CLINIC | Age: 55
End: 2025-06-03
Payer: COMMERCIAL

## 2025-06-24 DIAGNOSIS — E55.9 VITAMIN D DEFICIENCY: ICD-10-CM

## 2025-06-24 DIAGNOSIS — Z13.89 SCREENING FOR CARDIOVASCULAR, RESPIRATORY, AND GENITOURINARY DISEASES: ICD-10-CM

## 2025-06-24 DIAGNOSIS — E11.65 TYPE 2 DIABETES MELLITUS WITH HYPERGLYCEMIA, WITHOUT LONG-TERM CURRENT USE OF INSULIN: ICD-10-CM

## 2025-06-24 DIAGNOSIS — E78.2 MIXED HYPERLIPIDEMIA: ICD-10-CM

## 2025-06-24 DIAGNOSIS — Z13.21 SCREENING FOR ENDOCRINE, NUTRITIONAL, METABOLIC AND IMMUNITY DISORDER: ICD-10-CM

## 2025-06-24 DIAGNOSIS — Z13.83 SCREENING FOR CARDIOVASCULAR, RESPIRATORY, AND GENITOURINARY DISEASES: ICD-10-CM

## 2025-06-24 DIAGNOSIS — Z13.29 SCREENING FOR ENDOCRINE, NUTRITIONAL, METABOLIC AND IMMUNITY DISORDER: ICD-10-CM

## 2025-06-24 DIAGNOSIS — I10 PRIMARY HYPERTENSION: Primary | ICD-10-CM

## 2025-06-24 DIAGNOSIS — Z13.0 SCREENING FOR ENDOCRINE, NUTRITIONAL, METABOLIC AND IMMUNITY DISORDER: ICD-10-CM

## 2025-06-24 DIAGNOSIS — Z00.00 WELLNESS EXAMINATION: ICD-10-CM

## 2025-06-24 DIAGNOSIS — Z79.899 ENCOUNTER FOR LONG-TERM (CURRENT) USE OF MEDICATIONS: ICD-10-CM

## 2025-06-24 DIAGNOSIS — Z13.228 SCREENING FOR ENDOCRINE, NUTRITIONAL, METABOLIC AND IMMUNITY DISORDER: ICD-10-CM

## 2025-06-24 DIAGNOSIS — E04.2 MULTINODULAR THYROID: ICD-10-CM

## 2025-06-24 DIAGNOSIS — Z13.6 SCREENING FOR CARDIOVASCULAR, RESPIRATORY, AND GENITOURINARY DISEASES: ICD-10-CM

## 2025-06-25 ENCOUNTER — TELEPHONE (OUTPATIENT)
Dept: INTERNAL MEDICINE | Facility: CLINIC | Age: 55
End: 2025-06-25
Payer: COMMERCIAL

## 2025-06-25 NOTE — TELEPHONE ENCOUNTER
----- Message from Med Assistant Tang sent at 6/24/2025  9:03 AM CDT -----  Regarding: PV Wednesday 7-2-25  Wellness Appointment    Fasting wellness labs ordered and ready to do.     Last Wellness 7-1-24

## 2025-06-26 DIAGNOSIS — E78.2 MIXED HYPERLIPIDEMIA: ICD-10-CM

## 2025-06-26 DIAGNOSIS — E11.51 TYPE 2 DIABETES MELLITUS WITH DIABETIC PERIPHERAL ANGIOPATHY WITHOUT GANGRENE, WITHOUT LONG-TERM CURRENT USE OF INSULIN: ICD-10-CM

## 2025-06-26 RX ORDER — ROSUVASTATIN CALCIUM 10 MG/1
10 TABLET, COATED ORAL
Qty: 90 TABLET | Refills: 3 | Status: SHIPPED | OUTPATIENT
Start: 2025-06-26

## 2025-07-29 ENCOUNTER — TELEPHONE (OUTPATIENT)
Dept: INTERNAL MEDICINE | Facility: CLINIC | Age: 55
End: 2025-07-29
Payer: COMMERCIAL

## 2025-07-29 NOTE — TELEPHONE ENCOUNTER
----- Message from Nurse Suresh sent at 7/17/2025 11:54 AM CDT -----  Regarding: PV for 7/17/25  Wellness labs. She is due for wellness, Can you change appt to wellness? I was able to make it a 40 min slot.   verbal instruction/written material

## 2025-08-01 ENCOUNTER — LAB VISIT (OUTPATIENT)
Dept: LAB | Facility: HOSPITAL | Age: 55
End: 2025-08-01
Payer: COMMERCIAL

## 2025-08-01 DIAGNOSIS — E11.65 TYPE 2 DIABETES MELLITUS WITH HYPERGLYCEMIA, WITHOUT LONG-TERM CURRENT USE OF INSULIN: Chronic | ICD-10-CM

## 2025-08-01 DIAGNOSIS — E66.813 CLASS 3 SEVERE OBESITY DUE TO EXCESS CALORIES WITH SERIOUS COMORBIDITY AND BODY MASS INDEX (BMI) OF 40.0 TO 44.9 IN ADULT: Chronic | ICD-10-CM

## 2025-08-01 DIAGNOSIS — Z00.00 WELLNESS EXAMINATION: ICD-10-CM

## 2025-08-01 DIAGNOSIS — E11.51 TYPE 2 DIABETES MELLITUS WITH DIABETIC PERIPHERAL ANGIOPATHY WITHOUT GANGRENE, WITHOUT LONG-TERM CURRENT USE OF INSULIN: ICD-10-CM

## 2025-08-01 LAB
ALBUMIN SERPL-MCNC: 3.8 G/DL (ref 3.5–5)
ALBUMIN/GLOB SERPL: 1 RATIO (ref 1.1–2)
ALP SERPL-CCNC: 60 UNIT/L (ref 40–150)
ALT SERPL-CCNC: 34 UNIT/L (ref 0–55)
ANION GAP SERPL CALC-SCNC: 8 MEQ/L
AST SERPL-CCNC: 24 UNIT/L (ref 11–45)
BILIRUB SERPL-MCNC: 0.4 MG/DL
BUN SERPL-MCNC: 12 MG/DL (ref 9.8–20.1)
CALCIUM SERPL-MCNC: 9.6 MG/DL (ref 8.4–10.2)
CHLORIDE SERPL-SCNC: 105 MMOL/L (ref 98–107)
CO2 SERPL-SCNC: 27 MMOL/L (ref 22–29)
CREAT SERPL-MCNC: 0.59 MG/DL (ref 0.55–1.02)
CREAT/UREA NIT SERPL: 20
EST. AVERAGE GLUCOSE BLD GHB EST-MCNC: 134.1 MG/DL
GFR SERPLBLD CREATININE-BSD FMLA CKD-EPI: >60 ML/MIN/1.73/M2
GLOBULIN SER-MCNC: 3.8 GM/DL (ref 2.4–3.5)
GLUCOSE SERPL-MCNC: 115 MG/DL (ref 74–100)
HBA1C MFR BLD: 6.3 %
POTASSIUM SERPL-SCNC: 4.6 MMOL/L (ref 3.5–5.1)
PROT SERPL-MCNC: 7.6 GM/DL (ref 6.4–8.3)
SODIUM SERPL-SCNC: 140 MMOL/L (ref 136–145)

## 2025-08-01 PROCEDURE — 36415 COLL VENOUS BLD VENIPUNCTURE: CPT

## 2025-08-01 PROCEDURE — 80053 COMPREHEN METABOLIC PANEL: CPT

## 2025-08-01 PROCEDURE — 83036 HEMOGLOBIN GLYCOSYLATED A1C: CPT

## 2025-08-05 ENCOUNTER — OFFICE VISIT (OUTPATIENT)
Dept: INTERNAL MEDICINE | Facility: CLINIC | Age: 55
End: 2025-08-05
Payer: COMMERCIAL

## 2025-08-05 VITALS
DIASTOLIC BLOOD PRESSURE: 72 MMHG | WEIGHT: 231 LBS | SYSTOLIC BLOOD PRESSURE: 124 MMHG | HEART RATE: 73 BPM | HEIGHT: 66 IN | BODY MASS INDEX: 37.12 KG/M2 | OXYGEN SATURATION: 98 %

## 2025-08-05 DIAGNOSIS — E11.65 TYPE 2 DIABETES MELLITUS WITH HYPERGLYCEMIA, WITHOUT LONG-TERM CURRENT USE OF INSULIN: Chronic | ICD-10-CM

## 2025-08-05 DIAGNOSIS — Z12.39 ENCOUNTER FOR OTHER SCREENING FOR MALIGNANT NEOPLASM OF BREAST: ICD-10-CM

## 2025-08-05 DIAGNOSIS — Z23 NEED FOR VACCINATION: ICD-10-CM

## 2025-08-05 DIAGNOSIS — J30.89 ENVIRONMENTAL AND SEASONAL ALLERGIES: Chronic | ICD-10-CM

## 2025-08-05 DIAGNOSIS — E04.2 MULTIPLE THYROID NODULES: Chronic | ICD-10-CM

## 2025-08-05 DIAGNOSIS — E66.813 CLASS 3 SEVERE OBESITY DUE TO EXCESS CALORIES WITH SERIOUS COMORBIDITY AND BODY MASS INDEX (BMI) OF 40.0 TO 44.9 IN ADULT: Chronic | ICD-10-CM

## 2025-08-05 DIAGNOSIS — Z00.00 WELLNESS EXAMINATION: Primary | ICD-10-CM

## 2025-08-05 PROBLEM — H66.002 NON-RECURRENT ACUTE SUPPURATIVE OTITIS MEDIA OF LEFT EAR WITHOUT SPONTANEOUS RUPTURE OF TYMPANIC MEMBRANE: Status: RESOLVED | Noted: 2024-07-01 | Resolved: 2025-08-05

## 2025-08-05 LAB
LEFT EYE DM RETINOPATHY: NEGATIVE
RIGHT EYE DM RETINOPATHY: NEGATIVE

## 2025-08-05 PROCEDURE — 4010F ACE/ARB THERAPY RXD/TAKEN: CPT | Mod: CPTII,,, | Performed by: INTERNAL MEDICINE

## 2025-08-05 PROCEDURE — 3078F DIAST BP <80 MM HG: CPT | Mod: CPTII,,, | Performed by: INTERNAL MEDICINE

## 2025-08-05 PROCEDURE — 3008F BODY MASS INDEX DOCD: CPT | Mod: CPTII,,, | Performed by: INTERNAL MEDICINE

## 2025-08-05 PROCEDURE — 99396 PREV VISIT EST AGE 40-64: CPT | Mod: 25,,, | Performed by: INTERNAL MEDICINE

## 2025-08-05 PROCEDURE — 2023F DILAT RTA XM W/O RTNOPTHY: CPT | Mod: CPTII,,, | Performed by: INTERNAL MEDICINE

## 2025-08-05 PROCEDURE — 1159F MED LIST DOCD IN RCRD: CPT | Mod: CPTII,,, | Performed by: INTERNAL MEDICINE

## 2025-08-05 PROCEDURE — 3044F HG A1C LEVEL LT 7.0%: CPT | Mod: CPTII,,, | Performed by: INTERNAL MEDICINE

## 2025-08-05 PROCEDURE — 90471 IMMUNIZATION ADMIN: CPT | Mod: ,,, | Performed by: INTERNAL MEDICINE

## 2025-08-05 PROCEDURE — 3074F SYST BP LT 130 MM HG: CPT | Mod: CPTII,,, | Performed by: INTERNAL MEDICINE

## 2025-08-05 PROCEDURE — 1160F RVW MEDS BY RX/DR IN RCRD: CPT | Mod: CPTII,,, | Performed by: INTERNAL MEDICINE

## 2025-08-05 PROCEDURE — 90750 HZV VACC RECOMBINANT IM: CPT | Mod: ,,, | Performed by: INTERNAL MEDICINE

## 2025-08-05 RX ORDER — FLUTICASONE PROPIONATE 50 MCG
1 SPRAY, SUSPENSION (ML) NASAL DAILY
COMMUNITY

## 2025-08-05 NOTE — ASSESSMENT & PLAN NOTE
Lab Results   Component Value Date    TSH 0.749 06/27/2024   -has a appointment with endocrinology for formal eval

## 2025-08-05 NOTE — PROGRESS NOTES
"   Patient ID: Ailyn Ramos is a 54 y.o. female.    Chief Complaint: Annual Exam (Annual wellness- no complaints or concerns today. Wants to increase Mounjaro )      Ailyn Ramos is a 54 y.o. female here today for a wellness visit. Medical comorbidities include HTN, dm 2, thyroid nodules, obesity. Also followed by high-risk breast clinic for significant family history breast cancer in mother (at 73 y.o.) and sister (at 54 y.o.).          Since last visit patient reports he has been fairly well without acute injury or major illness.  Most recent labs generally stable.  HGB A1c 6.3 well-controlled currently on Mounjaro 7.5 mg and metformin a 1000 mg b.i.d..  No polydipsia, polyphagia, or polyuria.  BMI 37.2 wishing to go up on Mounjaro to further aid with weight loss as well as glycemic control.  Unfortunately did not complete lipid profile, however remains compliant currently on rosuvastatin.  Also history significant for multiple thyroid nodules with previous thyroid ultrasound in 2024, does have appointment with endocrinology this afternoon.  Age-appropriate immunizations reviewed, agreeable to receive shingles vaccine today.  Orders for mammogram for breast cancer screening placed today as well.  Otherwise stable for today's visit.    Wellness:8/5/24    MEDICAL HISTORY:    Past Medical History:   Diagnosis Date    Conductive hearing loss     Knee pain     Mixed hyperlipidemia 03/10/2023    Multiple thyroid nodules     "Normal size thyroid gland with small nodular scattered throughout both lobes and no new nodule identified.  The largest thyroid nodule is a solid hypoechoic nodule at the posterior right mid thyroid lobe measuring 1.2 cm in greatest dimension which is only minimally enlarged dating back to a thyroid ultrasound 03/22/2017."  Date:   03/20/2023    Perforated tympanic membrane     Primary hypertension     Type 2 diabetes mellitus with hyperglycemia, without long-term current use of " "insulin 10/05/2022      Past Surgical History:   Procedure Laterality Date     diagnostic arthroscopic procedures of knee joint      Repair, primary, disrupted ligament, ankle; both collateral ligaments      TYMPANOPLASTY        Social History[1]       Health Maintenance Due   Topic Date Due    TETANUS VACCINE  Never done    Shingles Vaccine (1 of 2) Never done    Foot Exam  02/06/2024    COVID-19 Vaccine (1 - 2024-25 season) Never done    Mammogram  11/29/2024    Diabetes Urine Screening  06/27/2025          Patient Care Team:  Chela Sawyer MD as PCP - General (Internal Medicine)  Jesenia Montgomery MD as Consulting Physician (Obstetrics and Gynecology)  Edith Mauricio, RN as Diabetes Educator (Diabetes)  Ping Rothman MD as Consulting Physician (Ophthalmology)  Alma Vigil LPN as Care Coordinator      Review of Systems   Constitutional:  Negative for fatigue and fever.   HENT:  Negative for congestion, rhinorrhea, sore throat and trouble swallowing.    Eyes:  Negative for redness and visual disturbance.   Respiratory:  Negative for cough, chest tightness and shortness of breath.    Cardiovascular:  Negative for chest pain and palpitations.   Gastrointestinal:  Negative for abdominal pain, constipation, diarrhea, nausea and vomiting.   Genitourinary:  Negative for dysuria, flank pain, frequency and urgency.   Musculoskeletal:  Negative for arthralgias, gait problem and myalgias.   Skin:  Negative for rash and wound.   Neurological:  Negative for facial asymmetry, speech difficulty, weakness and headaches.   All other systems reviewed and are negative.      Objective:   /72 (BP Location: Left arm, Patient Position: Sitting)   Pulse 73   Ht 5' 6" (1.676 m)   Wt 104.8 kg (231 lb)   SpO2 98%   BMI 37.28 kg/m²      Physical Exam  Constitutional:       General: She is not in acute distress.     Appearance: Normal appearance. She is obese.   HENT:      Right Ear: Tympanic membrane, ear " canal and external ear normal.      Left Ear: Tympanic membrane, ear canal and external ear normal.      Nose: Nose normal.      Mouth/Throat:      Mouth: Mucous membranes are moist.      Pharynx: Oropharynx is clear.   Eyes:      Extraocular Movements: Extraocular movements intact.      Conjunctiva/sclera: Conjunctivae normal.      Pupils: Pupils are equal, round, and reactive to light.   Cardiovascular:      Rate and Rhythm: Normal rate and regular rhythm.      Pulses: Normal pulses.      Heart sounds: Normal heart sounds. No murmur heard.     No gallop.   Pulmonary:      Effort: Pulmonary effort is normal.      Breath sounds: Normal breath sounds. No wheezing.   Abdominal:      General: Bowel sounds are normal. There is no distension.      Palpations: Abdomen is soft. There is no mass.      Tenderness: There is no abdominal tenderness. There is no guarding.   Musculoskeletal:         General: Normal range of motion.      Right foot: Normal range of motion. No deformity or foot drop.      Left foot: Normal range of motion. No deformity or foot drop.   Feet:      Right foot:      Protective Sensation: 4 sites tested.  4 sites sensed.      Skin integrity: Skin integrity normal. No ulcer, skin breakdown or erythema.      Toenail Condition: Right toenails are normal.      Left foot:      Protective Sensation: 4 sites tested.  4 sites sensed.      Skin integrity: Skin integrity normal. No ulcer, skin breakdown or erythema.      Toenail Condition: Left toenails are normal.   Skin:     General: Skin is warm and dry.   Neurological:      Mental Status: She is alert. Mental status is at baseline.      Sensory: No sensory deficit.      Motor: No weakness.           Assessment:       ICD-10-CM ICD-9-CM   1. Wellness examination  Z00.00 V70.0   2. Type 2 diabetes mellitus with hyperglycemia, without long-term current use of insulin  E11.65 250.00     790.29   3. Multiple thyroid nodules  E04.2 241.1   4. Class 3 severe  "obesity due to excess calories with serious comorbidity and body mass index (BMI) of 40.0 to 44.9 in adult  E66.813 278.01    Z68.41 V85.41   5. Environmental and seasonal allergies  J30.89 477.8   6. Encounter for other screening for malignant neoplasm of breast  Z12.39 V76.19   7. Need for vaccination  Z23 V05.9        Plan:   1. Wellness examination  Assessment & Plan:  -patient feeling generally well today  -will obtain wellness labs within next few days  -age-appropriate screenings up-to-date  -immunizations reviewed and discussed  -encourage routine aerobic exercise 2 to 3 times a week  -increase fluid hydration       2. Type 2 diabetes mellitus with hyperglycemia, without long-term current use of insulin  Assessment & Plan:  Lab Results   Component Value Date    HGBA1C 6.3 08/01/2025    HGBA1C 6.5 (A) 04/07/2025      -controlled  -wishing to go up on GLP 1 medication for further weight loss and glycemic control  -currently on:  metFORMIN - 1000 MG b.i.d.; discontinue  Mounjaro 7.5 mg weekly; discontinue   Mounjaro 10 mg weekly;  INITIATE  -low-carbohydrate diet   -encouraged to obtain diabetic eye exam yearly   -encouraged to obtain diabetic foot exam yearly      Orders:  -     tirzepatide 10 mg/0.5 mL PnIj; Inject 10 mg into the skin every 7 days.  Dispense: 2 mL; Refill: 5    3. Multiple thyroid nodules  Overview:  "Normal size thyroid gland with small nodular scattered throughout both lobes and no new nodule identified.  The largest thyroid nodule is a solid hypoechoic nodule at the posterior right mid thyroid lobe measuring 1.2 cm in greatest dimension which is only minimally enlarged dating back to a thyroid ultrasound 03/22/2017."  Date:   03/20/2023    Assessment & Plan:  Lab Results   Component Value Date    TSH 0.749 06/27/2024   -has a appointment with endocrinology for formal eval    Orders:  -     Cancel: US Thyroid; Future; Expected date: 08/05/2025    4. Class 3 severe obesity due to excess " "calories with serious comorbidity and body mass index (BMI) of 40.0 to 44.9 in adult  Assessment & Plan:  Estimated body mass index is 37.28 kg/m² as calculated from the following:    Height as of this encounter: 5' 6" (1.676 m).    Weight as of this encounter: 104.8 kg (231 lb).   Currently on    Tirzepatide 7.5 mg weekly --discontinue   Mounjaro  10 mg weekly-- INITIATE     - Encourage exorcise for 30-45 min a day, 5x a week   - Log foods and track calories    - Eat lean meats (chicken, turkey, fish, lean ground beef)   - Avoid fried, fatty or processed foods.   - Avoid excessive carbohydrate rich foods (pasta, bread, rice, potatoes)      Orders:  -     tirzepatide 10 mg/0.5 mL PnIj; Inject 10 mg into the skin every 7 days.  Dispense: 2 mL; Refill: 5    5. Environmental and seasonal allergies  Assessment & Plan:  -stable   -currently on fluticasone nasal, continue  -avoid triggers      6. Encounter for other screening for malignant neoplasm of breast  -     Mammo Digital Screening Bilat w/ [facility] (XPD); Future; Expected date: 08/05/2025    7. Need for vaccination  -     varicella zoster (Shingrix) IM vaccine (>/= 49 yo)              Follow up in about 6 months (around 2/5/2026) for Blood Pressure, Diabetes, Cholesterol.   -plan specifics discussed above    Orders Placed This Encounter    Mammo Digital Screening Bilat w/ [facility]os (XPD)    varicella zoster (Shingrix) IM vaccine (>/= 49 yo)    tirzepatide 10 mg/0.5 mL PnIj        Medication List with Changes/Refills   New Medications    TIRZEPATIDE 10 MG/0.5 ML PNIJ    Inject 10 mg into the skin every 7 days.   Current Medications    ASCORBIC ACID, VITAMIN C, (VITAMIN C) 100 MG TABLET    Take 100 mg by mouth once daily.    ASPIRIN (ECOTRIN) 81 MG EC TABLET    Take 81 mg by mouth Daily.    BLOOD SUGAR DIAGNOSTIC STRP    To check BG 1 times daily, to use with insurance preferred meter    BLOOD-GLUCOSE METER KIT    To check BG 1 times daily, to use with insurance preferred " meter    FLUTICASONE PROPIONATE (FLONASE) 50 MCG/ACTUATION NASAL SPRAY    1 spray by Each Nostril route once daily.    GINKGO BILOBA 120 MG TAB    Take 1 tablet by mouth Daily.    LANCETS MISC    To check BG 1 times daily, to use with insurance preferred meter    LISINOPRIL (PRINIVIL,ZESTRIL) 5 MG TABLET    TAKE 1 TABLET(5 MG) BY MOUTH EVERY DAY    MECOBALAMIN (B12 ACTIVE ORAL)    Take 1 tablet by mouth Daily.    MULTIVITAMIN (THERAGRAN) PER TABLET    Take 1 tablet by mouth once daily.    ROSUVASTATIN (CRESTOR) 10 MG TABLET    TAKE 1 TABLET(10 MG) BY MOUTH DAILY    VITAMIN D (VITAMIN D3) 1000 UNITS TAB    Take 1,000 Units by mouth Daily.    ZINC GLUCONATE 50 MG TABLET    Take 50 mg by mouth once daily.   Discontinued Medications    METFORMIN (GLUCOPHAGE) 1000 MG TABLET    TAKE 1/2 TABLET BY MOUTH TWICE DAILY FOR 1 WEEK THEN TAKE 1 TABLET(1000 MG) BY MOUTH TWICE DAILY WITH MEALS    TIRZEPATIDE 7.5 MG/0.5 ML PNIJ    Inject 7.5 mg into the skin every 7 days.      This note was generated with the assistance of ambient listening technology. I attest to having reviewed and edited the generated note for accuracy, though some syntax or spelling errors may persist. Please contact the author of this note for any clarification.          [1]   Social History  Tobacco Use    Smoking status: Never    Smokeless tobacco: Never   Substance Use Topics    Alcohol use: Yes     Alcohol/week: 6.0 standard drinks of alcohol     Types: 6 Cans of beer per week     Comment: Social drinker sometimes I go months and dont drink at all    Drug use: Not Currently

## 2025-08-05 NOTE — ASSESSMENT & PLAN NOTE
Lab Results   Component Value Date    HGBA1C 6.3 08/01/2025    HGBA1C 6.5 (A) 04/07/2025      -controlled  -wishing to go up on GLP 1 medication for further weight loss and glycemic control  -currently on:  metFORMIN - 1000 MG b.i.d.; discontinue  Mounjaro 7.5 mg weekly; discontinue   Mounjaro 10 mg weekly;  INITIATE  -low-carbohydrate diet   -encouraged to obtain diabetic eye exam yearly   -encouraged to obtain diabetic foot exam yearly

## 2025-08-07 ENCOUNTER — PATIENT OUTREACH (OUTPATIENT)
Facility: CLINIC | Age: 55
End: 2025-08-07
Payer: COMMERCIAL

## 2025-08-07 NOTE — PROGRESS NOTES
Health Maintenance Topic(s) Outreach Outcomes & Actions Taken:    Eye Exam - Outreach Outcomes & Actions Taken  : Diabetic Eye External Records Uploaded, Care Team & History Updated if Applicable       Additional Notes:  Upload Diabetic Eye Exam: 8/5/2025 Dr Markel Ledesma

## 2025-08-11 DIAGNOSIS — I10 PRIMARY HYPERTENSION: ICD-10-CM

## 2025-08-11 RX ORDER — LISINOPRIL 5 MG/1
5 TABLET ORAL
Qty: 90 TABLET | Refills: 3 | Status: SHIPPED | OUTPATIENT
Start: 2025-08-11